# Patient Record
Sex: FEMALE | Race: BLACK OR AFRICAN AMERICAN | NOT HISPANIC OR LATINO | Employment: STUDENT | URBAN - METROPOLITAN AREA
[De-identification: names, ages, dates, MRNs, and addresses within clinical notes are randomized per-mention and may not be internally consistent; named-entity substitution may affect disease eponyms.]

---
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
## 2023-01-07 ENCOUNTER — HOSPITAL ENCOUNTER (EMERGENCY)
Facility: OTHER | Age: 18
Discharge: HOME | End: 2023-01-07
Attending: EMERGENCY MEDICINE
Payer: MEDICAID

## 2023-01-07 VITALS
HEIGHT: 68 IN | SYSTOLIC BLOOD PRESSURE: 126 MMHG | OXYGEN SATURATION: 95 % | HEART RATE: 103 BPM | DIASTOLIC BLOOD PRESSURE: 58 MMHG | RESPIRATION RATE: 20 BRPM | BODY MASS INDEX: 31.83 KG/M2 | TEMPERATURE: 100 F | WEIGHT: 210 LBS

## 2023-01-07 DIAGNOSIS — B34.9 VIRAL SYNDROME: Primary | ICD-10-CM

## 2023-01-07 LAB
B-HCG UR QL: NEGATIVE
CTP QC/QA: YES
POC MOLECULAR INFLUENZA A AGN: NEGATIVE
POC MOLECULAR INFLUENZA B AGN: NEGATIVE
SARS-COV-2 RDRP RESP QL NAA+PROBE: NEGATIVE

## 2023-01-07 PROCEDURE — 25000003 PHARM REV CODE 250: Performed by: EMERGENCY MEDICINE

## 2023-01-07 PROCEDURE — 87635 SARS-COV-2 COVID-19 AMP PRB: CPT | Performed by: EMERGENCY MEDICINE

## 2023-01-07 PROCEDURE — 81025 URINE PREGNANCY TEST: CPT | Performed by: EMERGENCY MEDICINE

## 2023-01-07 PROCEDURE — 99284 EMERGENCY DEPT VISIT MOD MDM: CPT

## 2023-01-07 RX ORDER — ACETAMINOPHEN 500 MG
1000 TABLET ORAL
Status: COMPLETED | OUTPATIENT
Start: 2023-01-07 | End: 2023-01-07

## 2023-01-07 RX ORDER — IBUPROFEN 800 MG/1
800 TABLET ORAL EVERY 6 HOURS PRN
Qty: 30 TABLET | Refills: 0 | Status: SHIPPED | OUTPATIENT
Start: 2023-01-07 | End: 2023-06-13

## 2023-01-07 RX ORDER — BENZONATATE 100 MG/1
100 CAPSULE ORAL 3 TIMES DAILY PRN
Qty: 20 CAPSULE | Refills: 0 | Status: SHIPPED | OUTPATIENT
Start: 2023-01-07 | End: 2023-01-09 | Stop reason: SDUPTHER

## 2023-01-07 RX ADMIN — ACETAMINOPHEN 1000 MG: 500 TABLET, FILM COATED ORAL at 05:01

## 2023-01-07 NOTE — ED PROVIDER NOTES
Encounter Date: 2023    SCRIBE #1 NOTE: I, Hemanth Kendall, am scribing for, and in the presence of,  Saurav Cardenas II, MD.     History     Chief Complaint   Patient presents with    Chills     Reports chills/body aches, productive cough and weakness onset three days ago, worsening today. Discomfort unrelieved by OTC meds. Aaox4.      Time seen by provider: 4:55 PM    This is a 17 y.o. female who presents with complaint of fever, chills, and headache that began last night. The patient also complains of a productive cough for the past 3 days. She reports taking ibuprofen yesterday for her symptoms with minimal relief. She denies any significant PMHx and she states she is vaccinated for Covid. She did not receive the flu vaccine. She denies having any prescription medications or any known allergies. She denies smoking. This is the extent of the patient's complaints at this time.     The history is provided by the patient.   Review of patient's allergies indicates:  No Known Allergies  No past medical history on file.  No past surgical history on file.  Family History   Problem Relation Age of Onset    Eclampsia Other     Miscarriages / Stillbirths Other      labor Other     Cancer Other      Social History     Tobacco Use    Smoking status: Never    Smokeless tobacco: Never   Substance Use Topics    Alcohol use: Never     Comment: socially    Drug use: Yes     Types: Marijuana     Comment: rarely     Review of Systems   Constitutional:  Positive for chills and fever. Negative for activity change, appetite change and diaphoresis.   HENT:  Negative for congestion, sore throat and trouble swallowing.    Eyes:  Negative for photophobia and visual disturbance.   Respiratory:  Positive for cough. Negative for chest tightness and shortness of breath.    Cardiovascular:  Negative for chest pain.   Gastrointestinal:  Negative for abdominal pain, nausea and vomiting.   Endocrine: Negative for polydipsia and  polyuria.   Genitourinary:  Negative for difficulty urinating and flank pain.   Musculoskeletal:  Negative for back pain and neck pain.   Skin:  Negative for rash.   Neurological:  Positive for headaches. Negative for weakness.   Psychiatric/Behavioral:  Negative for confusion.      Physical Exam     Initial Vitals [01/07/23 1624]   BP Pulse Resp Temp SpO2   119/71 (!) 116 (!) 22 (!) 100.5 °F (38.1 °C) 100 %      MAP       --         Physical Exam    Nursing note and vitals reviewed.  Constitutional: She appears well-developed and well-nourished. She is not diaphoretic. No distress.   Warm to touch but not diaphoretic. Not ill appearing.    HENT:   Head: Normocephalic and atraumatic.   Moist mucous membranes.   Eyes: Pupils are equal, round, and reactive to light.   No pallor or icterus.   Neck: Neck supple.   No cervical lymphadenopathy.    Normal range of motion.  Cardiovascular:  Normal rate, regular rhythm and normal heart sounds.     Exam reveals no gallop and no friction rub.       No murmur heard.  Pulmonary/Chest: Breath sounds normal. No respiratory distress. She has no wheezes. She has no rhonchi. She has no rales.   Abdominal: Abdomen is soft. There is no abdominal tenderness.   Musculoskeletal:         General: No tenderness or edema. Normal range of motion.      Cervical back: Normal range of motion and neck supple.     Lymphadenopathy:     She has no cervical adenopathy.   Neurological: She is alert and oriented to person, place, and time.   Skin: Skin is warm and dry.   No acute rash.    Psychiatric: She has a normal mood and affect. Her behavior is normal. Judgment and thought content normal.       ED Course   Procedures  Labs Reviewed   SARS-COV-2 RDRP GENE   POCT INFLUENZA A/B MOLECULAR   POCT URINE PREGNANCY          Imaging Results    None          Medications   acetaminophen tablet 1,000 mg (1,000 mg Oral Given 1/7/23 3167)     Medical Decision Making:   History:   Old Medical Records: I decided  to obtain old medical records.  Clinical Tests:   Lab Tests: Ordered and Reviewed     Patient presents with fevers chills headaches cough for the past few days.  No complicating medical history.  Well-appearing on exam, clear lung fields.  COVID and flu swabs are negative.  Low clinical suspicion for pneumonia.  Suspect nonspecific viral syndrome.  Discussed fever care, fluids, return precautions.  Work note provided.       Scribe Attestation:   Scribe #1: I performed the above scribed service and the documentation accurately describes the services I performed. I attest to the accuracy of the note.          Physician Attestation for Scribe: I, [facility], reviewed documentation as scribed in my presence, which is both accurate and complete.           Clinical Impression:   Final diagnoses:  [B34.9] Viral syndrome (Primary)        ED Disposition Condition    Discharge Stable          ED Prescriptions       Medication Sig Dispense Start Date End Date Auth. Provider    ibuprofen (ADVIL,MOTRIN) 800 MG tablet Take 1 tablet (800 mg total) by mouth every 6 (six) hours as needed for Pain. 30 tablet 1/7/2023 -- Saurav Cardenas II, MD    benzonatate (TESSALON) 100 MG capsule Take 1 capsule (100 mg total) by mouth 3 (three) times daily as needed for Cough. 20 capsule 1/7/2023 1/17/2023 Saurav Cardenas II, MD          Follow-up Information       Follow up With Specialties Details Why Contact Info    Primary Care Clinic  Schedule an appointment as soon as possible for a visit in 5 days               Saurav Cardenas II, MD  01/08/23 0040

## 2023-01-07 NOTE — Clinical Note
"Kaye "Gabbigil" Luis A was seen and treated in our emergency department on 1/7/2023.  She may return to work on 01/11/2023.       If you have any questions or concerns, please don't hesitate to call.      Saurav Cardenas II, MD"

## 2023-01-08 ENCOUNTER — HOSPITAL ENCOUNTER (EMERGENCY)
Facility: OTHER | Age: 18
Discharge: HOME | End: 2023-01-09
Attending: EMERGENCY MEDICINE
Payer: COMMERCIAL

## 2023-01-08 DIAGNOSIS — J18.9 PNEUMONIA OF LEFT LOWER LOBE DUE TO INFECTIOUS ORGANISM: Primary | ICD-10-CM

## 2023-01-08 DIAGNOSIS — R05.9 COUGH IN ADULT: ICD-10-CM

## 2023-01-08 PROCEDURE — 25000003 PHARM REV CODE 250: Performed by: EMERGENCY MEDICINE

## 2023-01-08 PROCEDURE — 99284 EMERGENCY DEPT VISIT MOD MDM: CPT | Mod: 25

## 2023-01-08 PROCEDURE — 96360 HYDRATION IV INFUSION INIT: CPT

## 2023-01-08 RX ADMIN — SODIUM CHLORIDE 1000 ML: 0.9 INJECTION, SOLUTION INTRAVENOUS at 11:01

## 2023-01-09 VITALS
DIASTOLIC BLOOD PRESSURE: 72 MMHG | WEIGHT: 200 LBS | TEMPERATURE: 98 F | OXYGEN SATURATION: 96 % | HEART RATE: 90 BPM | BODY MASS INDEX: 30.41 KG/M2 | SYSTOLIC BLOOD PRESSURE: 128 MMHG | RESPIRATION RATE: 18 BRPM

## 2023-01-09 PROCEDURE — 63700000 PHARM REV CODE 250 ALT 637 W/O HCPCS: Performed by: EMERGENCY MEDICINE

## 2023-01-09 RX ORDER — AZITHROMYCIN 250 MG/1
TABLET, FILM COATED ORAL
Qty: 4 TABLET | Refills: 0 | Status: SHIPPED | OUTPATIENT
Start: 2023-01-09 | End: 2023-01-09

## 2023-01-09 RX ORDER — AZITHROMYCIN 250 MG/1
TABLET, FILM COATED ORAL
Qty: 4 TABLET | Refills: 0 | Status: SHIPPED | OUTPATIENT
Start: 2023-01-09 | End: 2023-01-09 | Stop reason: SDUPTHER

## 2023-01-09 RX ORDER — AZITHROMYCIN 250 MG/1
500 TABLET, FILM COATED ORAL
Status: COMPLETED | OUTPATIENT
Start: 2023-01-09 | End: 2023-01-09

## 2023-01-09 RX ORDER — AZITHROMYCIN 250 MG/1
TABLET, FILM COATED ORAL
Qty: 4 TABLET | Refills: 0 | Status: SHIPPED | OUTPATIENT
Start: 2023-01-09 | End: 2023-01-14

## 2023-01-09 RX ORDER — BENZONATATE 100 MG/1
100 CAPSULE ORAL 3 TIMES DAILY PRN
Qty: 20 CAPSULE | Refills: 0 | Status: SHIPPED | OUTPATIENT
Start: 2023-01-09 | End: 2023-01-19

## 2023-01-09 RX ORDER — BENZONATATE 100 MG/1
100 CAPSULE ORAL 3 TIMES DAILY PRN
Qty: 20 CAPSULE | Refills: 0 | Status: SHIPPED | OUTPATIENT
Start: 2023-01-09 | End: 2023-01-09

## 2023-01-09 RX ADMIN — AZITHROMYCIN MONOHYDRATE 500 MG: 250 TABLET ORAL at 01:01

## 2023-01-09 NOTE — ED PROVIDER NOTES
Encounter Date: 2023    SCRIBE #1 NOTE: I, David Serna, am scribing for, and in the presence of,  Lambert Bustos MD. I have scribed the following portions of the note - Other sections scribed: HPI, ROS, PE CXR.     History     Chief Complaint   Patient presents with    Shortness of Breath     Pt arrives via EMS for SOB with productive cough X 1 hour.      Time seen by provider: 1:28 AM    This is a 17 y.o. female who presents with complaint of shortness of breath starting today. Patient was seen one day ago for fevers, chills, productive cough, and intermittent lightheadedness. These symptoms have since worsened and she now reports palpitations as well as a pre-syncopal episode PTA. She denies any wheezing. SHx of positive sick contact with a nephew who was diagnosed with pneumonia recently. This is the extent of the patient's complaints at this time.    The history is provided by the patient.   Review of patient's allergies indicates:  No Known Allergies  History reviewed. No pertinent past medical history.  History reviewed. No pertinent surgical history.  Family History   Problem Relation Age of Onset    Eclampsia Other     Miscarriages / Stillbirths Other      labor Other     Cancer Other      Social History     Tobacco Use    Smoking status: Never    Smokeless tobacco: Never   Substance Use Topics    Alcohol use: Yes     Comment: socially    Drug use: Yes     Types: Marijuana     Comment: rarely     Review of Systems   Constitutional:  Positive for chills and fever.   HENT:  Negative for congestion and sore throat.    Eyes:  Negative for photophobia and redness.   Respiratory:  Positive for shortness of breath. Negative for cough.    Cardiovascular:  Positive for chest pain. Negative for leg swelling.   Gastrointestinal:  Negative for abdominal pain, nausea and vomiting.   Genitourinary:  Negative for dysuria.   Musculoskeletal:  Negative for back pain.   Skin:  Negative for rash.    Neurological:  Positive for light-headedness. Negative for weakness and headaches.   Psychiatric/Behavioral:  Negative for confusion.      Physical Exam     Initial Vitals   BP Pulse Resp Temp SpO2   01/08/23 2310 01/08/23 2310 01/08/23 2310 01/08/23 2310 01/08/23 2308   125/74 (!) 120 (!) 22 98.2 °F (36.8 °C) (!) 94 %      MAP       --                Physical Exam    ED Course   Procedures  Labs Reviewed   SARS-COV-2 RDRP GENE   POCT INFLUENZA A/B MOLECULAR          Imaging Results              X-Ray Chest PA And Lateral (Final result)  Result time 01/09/23 00:33:37      Final result by Dinora Plummer MD (01/09/23 00:33:37)                   Impression:      Findings concerning for left lower lobe pneumonia.      Electronically signed by: Dinora Plummer  Date:    01/09/2023  Time:    00:33               Narrative:    EXAMINATION:  CHEST PA AND LATERAL    CLINICAL HISTORY:  Cough, unspecified    TECHNIQUE:  PA and lateral chest radiograph    COMPARISON:  <None.>    FINDINGS:  The cardiac silhouette is within normal limits. Scratch there is a left lower lobe infiltrate.  There is no pneumothorax or significant pleural fluid.  Note is made of bilateral nipple piercings.                                    X-Rays:   Independently Interpreted Readings:   Chest X-Ray: Left lower lobe infiltrate. No enlarged cardiac silhouette.   Medications   sodium chloride 0.9% bolus 1,000 mL 1,000 mL (1,000 mLs Intravenous New Bag 1/8/23 4011)     Medical Decision Making:   History:   Old Medical Records: I decided to obtain old medical records.  Independently Interpreted Test(s):   I have ordered and independently interpreted X-rays - see prior notes.  Clinical Tests:   Radiological Study: Ordered and Reviewed        Scribe Attestation:   Scribe #1: I performed the above scribed service and the documentation accurately describes the services I performed. I attest to the accuracy of the note.    Attending Attestation:              Attending ED Notes:   Emergent evaluation of 17-year-old female with recent diagnosis of viral syndrome/upper respiratory infection who now presents to the emergency room with feeling short of breath and worsening cough.  Patient is afebrile, nontoxic-appearing stable vital signs.  Repeat oxygen saturation by MD is 98% on room air.  Patient in no acute respiratory distress.  Chest x-ray reveals left lower lobe consolidation consistent with pneumonia.  The patient is extensively counseled on their diagnosis and treatment including all diagnostic and physical exam findings.  The patient is discharged good condition and directed follow-up with their PCP in the next 24-48 hours.          Physician Attestation for Scribe: I, Lambert Longo, reviewed documentation as scribed in my presence, which is both accurate and complete.         Clinical Impression:   Final diagnoses:  [R05.9] Cough in adult               Lambert Longo MD  01/09/23 0402

## 2023-05-19 ENCOUNTER — OFFICE VISIT (OUTPATIENT)
Dept: OBSTETRICS AND GYNECOLOGY | Facility: CLINIC | Age: 18
End: 2023-05-19
Payer: MEDICAID

## 2023-05-19 VITALS
SYSTOLIC BLOOD PRESSURE: 120 MMHG | BODY MASS INDEX: 34.6 KG/M2 | DIASTOLIC BLOOD PRESSURE: 70 MMHG | HEIGHT: 67 IN | WEIGHT: 220.44 LBS

## 2023-05-19 DIAGNOSIS — B37.31 YEAST VAGINITIS: ICD-10-CM

## 2023-05-19 DIAGNOSIS — Z20.2 POTENTIAL EXPOSURE TO STD: ICD-10-CM

## 2023-05-19 DIAGNOSIS — N89.8 VAGINAL DISCHARGE: Primary | ICD-10-CM

## 2023-05-19 LAB
CANDIDA RRNA VAG QL PROBE: POSITIVE
G VAGINALIS RRNA GENITAL QL PROBE: NEGATIVE
T VAGINALIS RRNA GENITAL QL PROBE: NEGATIVE

## 2023-05-19 PROCEDURE — 99213 OFFICE O/P EST LOW 20 MIN: CPT | Mod: PBBFAC | Performed by: FAMILY MEDICINE

## 2023-05-19 PROCEDURE — 87510 GARDNER VAG DNA DIR PROBE: CPT | Performed by: FAMILY MEDICINE

## 2023-05-19 PROCEDURE — 99999 PR PBB SHADOW E&M-EST. PATIENT-LVL III: ICD-10-PCS | Mod: PBBFAC,,, | Performed by: FAMILY MEDICINE

## 2023-05-19 PROCEDURE — 1160F RVW MEDS BY RX/DR IN RCRD: CPT | Mod: CPTII,,, | Performed by: FAMILY MEDICINE

## 2023-05-19 PROCEDURE — 87591 N.GONORRHOEAE DNA AMP PROB: CPT | Performed by: FAMILY MEDICINE

## 2023-05-19 PROCEDURE — 1159F PR MEDICATION LIST DOCUMENTED IN MEDICAL RECORD: ICD-10-PCS | Mod: CPTII,,, | Performed by: FAMILY MEDICINE

## 2023-05-19 PROCEDURE — 99999 PR PBB SHADOW E&M-EST. PATIENT-LVL III: CPT | Mod: PBBFAC,,, | Performed by: FAMILY MEDICINE

## 2023-05-19 PROCEDURE — 3008F BODY MASS INDEX DOCD: CPT | Mod: CPTII,,, | Performed by: FAMILY MEDICINE

## 2023-05-19 PROCEDURE — 1160F PR REVIEW ALL MEDS BY PRESCRIBER/CLIN PHARMACIST DOCUMENTED: ICD-10-PCS | Mod: CPTII,,, | Performed by: FAMILY MEDICINE

## 2023-05-19 PROCEDURE — 3074F SYST BP LT 130 MM HG: CPT | Mod: CPTII,,, | Performed by: FAMILY MEDICINE

## 2023-05-19 PROCEDURE — 1159F MED LIST DOCD IN RCRD: CPT | Mod: CPTII,,, | Performed by: FAMILY MEDICINE

## 2023-05-19 PROCEDURE — 3074F PR MOST RECENT SYSTOLIC BLOOD PRESSURE < 130 MM HG: ICD-10-PCS | Mod: CPTII,,, | Performed by: FAMILY MEDICINE

## 2023-05-19 PROCEDURE — 3078F DIAST BP <80 MM HG: CPT | Mod: CPTII,,, | Performed by: FAMILY MEDICINE

## 2023-05-19 PROCEDURE — 87480 CANDIDA DNA DIR PROBE: CPT | Performed by: FAMILY MEDICINE

## 2023-05-19 PROCEDURE — 99213 PR OFFICE/OUTPT VISIT, EST, LEVL III, 20-29 MIN: ICD-10-PCS | Mod: S$PBB,,, | Performed by: FAMILY MEDICINE

## 2023-05-19 PROCEDURE — 3008F PR BODY MASS INDEX (BMI) DOCUMENTED: ICD-10-PCS | Mod: CPTII,,, | Performed by: FAMILY MEDICINE

## 2023-05-19 PROCEDURE — 3078F PR MOST RECENT DIASTOLIC BLOOD PRESSURE < 80 MM HG: ICD-10-PCS | Mod: CPTII,,, | Performed by: FAMILY MEDICINE

## 2023-05-19 PROCEDURE — 99213 OFFICE O/P EST LOW 20 MIN: CPT | Mod: S$PBB,,, | Performed by: FAMILY MEDICINE

## 2023-05-19 RX ORDER — FLUCONAZOLE 150 MG/1
150 TABLET ORAL
Qty: 2 TABLET | Refills: 0 | Status: SHIPPED | OUTPATIENT
Start: 2023-05-19 | End: 2023-06-13

## 2023-05-19 NOTE — PROGRESS NOTES
CC: Vaginitis  HPI: Patient presents for evaluation of an abnormal vaginal discharge. Symptoms have been present for a few days. Vaginal symptoms: discharge described as curd-like and yellow and local irritation. Contraception: condoms. She denies odor and urinary symptoms of dysuria, hematuria, and fever . Sexually transmitted infection risk: very low risk of STD exposure. SA male condoms consistently.. This is the extent of the patient's complaints at this time.     ROS:  GENERAL: No fever, chills, fatigability or weight loss.  VULVAR: No pain, no lesions and no itching.  VAGINAL: No relaxation, + itching/irritation, + discharge, no abnormal bleeding and no lesions.  URINARY: No incontinence, no nocturia, no frequency and no dysuria.     PHYSICAL EXAM:  Physical Exam:   Constitutional: She appears well-developed and well-nourished. She does not appear ill. No distress.               Genitourinary:    Uterus, right adnexa and left adnexa normal.      Pelvic exam was performed with patient supine.   The external female genitalia was normal.   Labial bartholins normal.There is no rash, tenderness or lesion on the right labia. There is no rash, tenderness or lesion on the left labia. Cervix is normal. Right adnexum displays no mass, no tenderness and no fullness. Left adnexum displays no mass, no tenderness and no fullness. There is vaginal discharge (thick white-yellow adherent to vaginal walls) in the vagina. No tenderness, bleeding, rectocele, cystocele or unspecified prolapse of vaginal walls in the vagina.    No foreign body in the vagina.   Cervix exhibits no motion tenderness, no lesion, no discharge, no friability, no lesion and no polyp. Normal urethral meatus.Urethra findings: no urethral mass              Neurological: She is alert. GCS eye subscore is 4. GCS verbal subscore is 5. GCS motor subscore is 6.         History reviewed. No pertinent past medical history.    History reviewed. No pertinent  surgical history.    Family History   Problem Relation Age of Onset    Eclampsia Other     Miscarriages / Stillbirths Other      labor Other     Cancer Other        Social History     Socioeconomic History    Marital status: Single   Tobacco Use    Smoking status: Never    Smokeless tobacco: Never   Substance and Sexual Activity    Alcohol use: Yes     Comment: socially    Drug use: Yes     Types: Marijuana     Comment: rarely    Sexual activity: Yes     Partners: Male     Birth control/protection: None       Current Outpatient Medications   Medication Sig Dispense Refill    ibuprofen (ADVIL,MOTRIN) 800 MG tablet Take 1 tablet (800 mg total) by mouth every 6 (six) hours as needed for Pain. 30 tablet 0    fluconazole (DIFLUCAN) 150 MG Tab Take 1 tablet (150 mg total) by mouth every 72 hours as needed (vaginal itching/discharge). 2 tablet 0    hydrocortisone valerate (WEST-ALVIN) 0.2 % ointment Apply topically 2 (two) times daily. for 10 days 15 g 0     No current facility-administered medications for this visit.       Review of patient's allergies indicates:  No Known Allergies       OB History    Para Term  AB Living   0 0 0 0 0 0   SAB IAB Ectopic Multiple Live Births   0 0 0 0 0          Assessment/Plan:    Vaginal discharge  -     Vaginosis Screen by DNA Probe    Potential exposure to STD  -     C. trachomatis/N. gon[facility]rhoeae [facility]; Cervicovaginal    Yeast vaginitis  -     fluconazole (DIFLUCAN) 150 MG Tab; Take 1 tablet (150 mg total) by mouth every 72 hours as needed (vaginal itching/discharge).  Dispense: 2 tablet; Refill: 0          Patient was counseled today on vaginitis prevention including :  a. avoiding feminine products such as deoderant soaps, body wash, bubble bath, douches, scented toilet paper, deoderant tampons or pads, feminine wipes, chronic pad use, etc.  b. avoiding other vulvovaginal irritants such as long hot baths, humidity, tight, synthetic clothing, chlorine  and sitting around in wet bathing suits  c. wearing cotton underwear, avoiding thong underwear and no underwear to bed  d. taking showers instead of baths and use a hair dryer on cool setting afterwards to dry  e. wearing cotton to exercise and shower immediately after exercise and change clothes  f. using polyurethane condoms without spermicide if sexually active and symptoms are triggered by intercourse     FOLLOW UP: PRN/lack of improvement.

## 2023-05-22 ENCOUNTER — PATIENT MESSAGE (OUTPATIENT)
Dept: OBSTETRICS AND GYNECOLOGY | Facility: CLINIC | Age: 18
End: 2023-05-22
Payer: MEDICAID

## 2023-05-23 LAB
C TRACH DNA SPEC QL NAA+PROBE: NOT DETECTED
N GONORRHOEA DNA SPEC QL NAA+PROBE: NOT DETECTED

## 2023-06-13 ENCOUNTER — OFFICE VISIT (OUTPATIENT)
Dept: OBSTETRICS AND GYNECOLOGY | Facility: CLINIC | Age: 18
End: 2023-06-13
Payer: MEDICAID

## 2023-06-13 VITALS
HEIGHT: 67 IN | WEIGHT: 218.25 LBS | DIASTOLIC BLOOD PRESSURE: 84 MMHG | BODY MASS INDEX: 34.26 KG/M2 | SYSTOLIC BLOOD PRESSURE: 126 MMHG

## 2023-06-13 DIAGNOSIS — L75.0 URINARY BODY ODOR: ICD-10-CM

## 2023-06-13 DIAGNOSIS — Z32.02 NEGATIVE PREGNANCY TEST: ICD-10-CM

## 2023-06-13 DIAGNOSIS — R30.0 DYSURIA: Primary | ICD-10-CM

## 2023-06-13 DIAGNOSIS — Z20.2 POTENTIAL EXPOSURE TO STD: ICD-10-CM

## 2023-06-13 LAB
B-HCG UR QL: NEGATIVE
BILIRUB UR QL STRIP: NEGATIVE
CTP QC/QA: YES
GLUCOSE UR QL STRIP: NEGATIVE
KETONES UR QL STRIP: NEGATIVE
LEUKOCYTE ESTERASE UR QL STRIP: NEGATIVE
PH, POC UA: 7
POC BLOOD, URINE: NEGATIVE
POC NITRATES, URINE: NEGATIVE
PROT UR QL STRIP: NEGATIVE
SP GR UR STRIP: 1.01 (ref 1–1.03)
UROBILINOGEN UR STRIP-ACNC: NORMAL (ref 0.1–1.1)

## 2023-06-13 PROCEDURE — 99213 PR OFFICE/OUTPT VISIT, EST, LEVL III, 20-29 MIN: ICD-10-PCS | Mod: S$PBB,,, | Performed by: FAMILY MEDICINE

## 2023-06-13 PROCEDURE — 87591 N.GONORRHOEAE DNA AMP PROB: CPT | Performed by: FAMILY MEDICINE

## 2023-06-13 PROCEDURE — 3079F PR MOST RECENT DIASTOLIC BLOOD PRESSURE 80-89 MM HG: ICD-10-PCS | Mod: CPTII,,, | Performed by: FAMILY MEDICINE

## 2023-06-13 PROCEDURE — 1159F MED LIST DOCD IN RCRD: CPT | Mod: CPTII,,, | Performed by: FAMILY MEDICINE

## 2023-06-13 PROCEDURE — 1160F PR REVIEW ALL MEDS BY PRESCRIBER/CLIN PHARMACIST DOCUMENTED: ICD-10-PCS | Mod: CPTII,,, | Performed by: FAMILY MEDICINE

## 2023-06-13 PROCEDURE — 3079F DIAST BP 80-89 MM HG: CPT | Mod: CPTII,,, | Performed by: FAMILY MEDICINE

## 2023-06-13 PROCEDURE — 81003 URINALYSIS AUTO W/O SCOPE: CPT | Mod: PBBFAC | Performed by: FAMILY MEDICINE

## 2023-06-13 PROCEDURE — 81025 URINE PREGNANCY TEST: CPT | Mod: PBBFAC | Performed by: FAMILY MEDICINE

## 2023-06-13 PROCEDURE — 87086 URINE CULTURE/COLONY COUNT: CPT | Performed by: FAMILY MEDICINE

## 2023-06-13 PROCEDURE — 99999 PR PBB SHADOW E&M-EST. PATIENT-LVL III: CPT | Mod: PBBFAC,,, | Performed by: FAMILY MEDICINE

## 2023-06-13 PROCEDURE — 1159F PR MEDICATION LIST DOCUMENTED IN MEDICAL RECORD: ICD-10-PCS | Mod: CPTII,,, | Performed by: FAMILY MEDICINE

## 2023-06-13 PROCEDURE — 1160F RVW MEDS BY RX/DR IN RCRD: CPT | Mod: CPTII,,, | Performed by: FAMILY MEDICINE

## 2023-06-13 PROCEDURE — 99999 PR PBB SHADOW E&M-EST. PATIENT-LVL III: ICD-10-PCS | Mod: PBBFAC,,, | Performed by: FAMILY MEDICINE

## 2023-06-13 PROCEDURE — 3008F BODY MASS INDEX DOCD: CPT | Mod: CPTII,,, | Performed by: FAMILY MEDICINE

## 2023-06-13 PROCEDURE — 81514 NFCT DS BV&VAGINITIS DNA ALG: CPT | Performed by: FAMILY MEDICINE

## 2023-06-13 PROCEDURE — 99213 OFFICE O/P EST LOW 20 MIN: CPT | Mod: PBBFAC | Performed by: FAMILY MEDICINE

## 2023-06-13 PROCEDURE — 3074F SYST BP LT 130 MM HG: CPT | Mod: CPTII,,, | Performed by: FAMILY MEDICINE

## 2023-06-13 PROCEDURE — 99213 OFFICE O/P EST LOW 20 MIN: CPT | Mod: S$PBB,,, | Performed by: FAMILY MEDICINE

## 2023-06-13 PROCEDURE — 3074F PR MOST RECENT SYSTOLIC BLOOD PRESSURE < 130 MM HG: ICD-10-PCS | Mod: CPTII,,, | Performed by: FAMILY MEDICINE

## 2023-06-13 PROCEDURE — 3008F PR BODY MASS INDEX (BMI) DOCUMENTED: ICD-10-PCS | Mod: CPTII,,, | Performed by: FAMILY MEDICINE

## 2023-06-13 NOTE — PROGRESS NOTES
"  Chief Complaint: Dysuria     HPI  Pt reports having burning with urination and odor for the last 14  Days. The burning is more right after urination with bladder discomfort. Nothing makes it better/worse. She reports  no urinary frequency, urgency, hematuria or flank pain at this time. She denies fever, chills, n/v/d. No abnormal discharge, itching. She has had a new partner that she used a condom with. Would like std testing. Cycle 17 days late, took home upt and neg. States last year she went 3 months without cycle then it returned to monthly. This is the extent of the patient's complaints at this time.     /84   Ht 5' 7" (1.702 m)   Wt 99 kg (218 lb 4.1 oz)   LMP 2023 (Exact Date)   BMI 34.18 kg/m²     History reviewed. No pertinent past medical history.    History reviewed. No pertinent surgical history.    Family History   Problem Relation Age of Onset    Eclampsia Other     Miscarriages / Stillbirths Other      labor Other     Cancer Other     Breast cancer Neg Hx     Colon cancer Neg Hx     Ovarian cancer Neg Hx        Social History     Socioeconomic History    Marital status: Single   Tobacco Use    Smoking status: Never    Smokeless tobacco: Never   Substance and Sexual Activity    Alcohol use: Yes     Comment: socially    Drug use: Yes     Types: Marijuana     Comment: rarely    Sexual activity: Yes     Partners: Male     Birth control/protection: None       Current Outpatient Medications   Medication Sig Dispense Refill    hydrocortisone valerate (WEST-ALVIN) 0.2 % ointment Apply topically 2 (two) times daily. for 10 days 15 g 0     No current facility-administered medications for this visit.       Review of patient's allergies indicates:  No Known Allergies       OB History    Para Term  AB Living   0 0 0 0 0 0   SAB IAB Ectopic Multiple Live Births   0 0 0 0 0          ROS   Systemic: No fever.  Gastrointestinal: No nausea and no suprapubic pain.  No diarrhea and " no constipation.  Genitourinary: No urinary loss of control.  Positive for Dysuria.  No flank pain. +odor. No vaginal discharge/itching  Skin: No rash.    Physical Exam   Physical Exam:   Constitutional: She appears well-developed and well-nourished. She does not appear ill. No distress.             Abdominal: Soft. Bowel sounds are normal. There is no abdominal tenderness. There is no rebound, no guarding, no right CVA tenderness and no left CVA tenderness.     Genitourinary:    Uterus, right adnexa and left adnexa normal.      Pelvic exam was performed with patient supine.   The external female genitalia was normal.   Labial bartholins normal.There is no rash, tenderness or lesion on the right labia. There is no rash, tenderness or lesion on the left labia. Cervix is normal. Right adnexum displays no mass, no tenderness and no fullness. Left adnexum displays no mass, no tenderness and no fullness. There is vaginal discharge (scant thin white no odor) in the vagina. No tenderness, bleeding, rectocele, cystocele or unspecified prolapse of vaginal walls in the vagina.    No foreign body in the vagina.   Cervix exhibits no motion tenderness, no lesion, no discharge, no friability, no lesion and no polyp. Normal urethral meatus.Urethra findings: no urethral mass              Neurological: She is alert. GCS eye subscore is 4. GCS verbal subscore is 5. GCS motor subscore is 6.       Results for orders placed or performed in visit on 06/13/23   POCT urine pregnancy   Result Value Ref Range    POC Preg Test, Ur Negative Negative     Acceptable Yes    POCT Urinalysis, Dipstick, Automated, W/O Scope   Result Value Ref Range    POC Blood, Urine Negative Negative    POC Bilirubin, Urine Negative Negative    POC Urobilinogen, Urine normal 0.1 - 1.1    POC Ketones, Urine Negative Negative    POC Protein, Urine Negative Negative    POC Nitrates, Urine Negative Negative    POC Glucose, Urine Negative Negative    pH,  UA 7     POC Specific Gravity, Urine 1.010 1.003 - 1.029    POC Leukocytes, Urine Negative Negative       Assessment/Plan:    Dysuria  -     POCT Urinalysis, Dipstick, Automated, W/O Scope  -     Urine culture    Negative pregnancy test  -     POCT urine pregnancy    Potential exposure to STD  -     C. trachomatis/N. gonorrhoeae by AMP DNA [facility]; Cervicovaginal    Urinary body odor  -     Vaginosis Screen by DNA Probe      -advised f/u if cycle continues to be irregular pt agreeable

## 2023-06-14 LAB
BACTERIA UR CULT: NORMAL
BACTERIA UR CULT: NORMAL
BACTERIAL VAGINOSIS DNA: NEGATIVE
C TRACH DNA SPEC QL NAA+PROBE: NOT DETECTED
CANDIDA GLABRATA DNA: NEGATIVE
CANDIDA KRUSEI DNA: NEGATIVE
CANDIDA RRNA VAG QL PROBE: NEGATIVE
N GONORRHOEA DNA SPEC QL NAA+PROBE: NOT DETECTED
T VAGINALIS RRNA GENITAL QL PROBE: NEGATIVE

## 2023-06-15 ENCOUNTER — PATIENT MESSAGE (OUTPATIENT)
Dept: OBSTETRICS AND GYNECOLOGY | Facility: CLINIC | Age: 18
End: 2023-06-15
Payer: MEDICAID

## 2023-06-15 DIAGNOSIS — R30.0 DYSURIA: Primary | ICD-10-CM

## 2023-06-15 RX ORDER — PHENAZOPYRIDINE HYDROCHLORIDE 200 MG/1
200 TABLET, FILM COATED ORAL 3 TIMES DAILY PRN
Qty: 6 TABLET | Refills: 0 | Status: SHIPPED | OUTPATIENT
Start: 2023-06-15 | End: 2023-06-17

## 2023-06-20 ENCOUNTER — LAB VISIT (OUTPATIENT)
Dept: LAB | Facility: OTHER | Age: 18
End: 2023-06-20
Attending: FAMILY MEDICINE
Payer: MEDICAID

## 2023-06-20 ENCOUNTER — TELEPHONE (OUTPATIENT)
Dept: OBSTETRICS AND GYNECOLOGY | Facility: CLINIC | Age: 18
End: 2023-06-20
Payer: MEDICAID

## 2023-06-20 DIAGNOSIS — R30.0 DYSURIA: Primary | ICD-10-CM

## 2023-06-20 DIAGNOSIS — R30.0 DYSURIA: ICD-10-CM

## 2023-06-20 PROCEDURE — 87077 CULTURE AEROBIC IDENTIFY: CPT | Performed by: FAMILY MEDICINE

## 2023-06-20 PROCEDURE — 87086 URINE CULTURE/COLONY COUNT: CPT | Performed by: FAMILY MEDICINE

## 2023-06-20 PROCEDURE — 87186 SC STD MICRODIL/AGAR DIL: CPT | Performed by: FAMILY MEDICINE

## 2023-06-20 PROCEDURE — 87088 URINE BACTERIA CULTURE: CPT | Performed by: FAMILY MEDICINE

## 2023-06-20 NOTE — TELEPHONE ENCOUNTER
Called pt back, advised pt to drop off urine specimen to lab today. Pt agreed and we found good time for pt's ,lab appt. Pt confirmed to cancel tomorrow's appointment [facility]. Pt verbalized understanding.       Returned pt's call, pt stated she is having odor and burning during urinating. Advised pt I will consult with JEANNE Liu and give the pt a call back.       ----- Message from Ruperto Argueta sent at 6/20/2023 11:04 AM CDT -----  Contact: Patient  Type:  Patient Call          Who Called: patient         Does the patient know what this is regarding?: requesting a call back to have a appt and orders for a possible UT pt said that she's in pain ; please advise           Would the patient rather a call back or a response via [facility]? call          Best Call Back Number:[phone] (home)              Additional Information:

## 2023-06-22 LAB — BACTERIA UR CULT: ABNORMAL

## 2023-06-23 DIAGNOSIS — N30.00 ACUTE CYSTITIS WITHOUT HEMATURIA: Primary | ICD-10-CM

## 2023-06-23 RX ORDER — NITROFURANTOIN 25; 75 MG/1; MG/1
100 CAPSULE ORAL 2 TIMES DAILY
Qty: 10 CAPSULE | Refills: 0 | Status: SHIPPED | OUTPATIENT
Start: 2023-06-23 | End: 2023-06-28

## 2023-08-10 ENCOUNTER — TELEPHONE (OUTPATIENT)
Dept: OBSTETRICS AND GYNECOLOGY | Facility: CLINIC | Age: 18
End: 2023-08-10
Payer: MEDICAID

## 2023-08-10 NOTE — TELEPHONE ENCOUNTER
----- Message from Ruperto Argueta sent at 8/9/2023  9:03 AM CDT -----  Contact: Patient  Type:  Patient Call          Who Called: Patient         Does the patient know what this is regarding?: requesting a call back for a same day appt ; pt said that she's in pain and have a strong urine smell when she use the restroom also discomfort  at all times; please advisee           Would the patient rather a call back or a response via [facility]? Call           Best Call Back Number:[phone] (home)              Additional Information:

## 2023-08-15 ENCOUNTER — LAB VISIT (OUTPATIENT)
Dept: LAB | Facility: OTHER | Age: 18
End: 2023-08-15
Attending: FAMILY MEDICINE
Payer: MEDICAID

## 2023-08-15 ENCOUNTER — OFFICE VISIT (OUTPATIENT)
Dept: OBSTETRICS AND GYNECOLOGY | Facility: CLINIC | Age: 18
End: 2023-08-15
Payer: MEDICAID

## 2023-08-15 VITALS
WEIGHT: 216.06 LBS | DIASTOLIC BLOOD PRESSURE: 60 MMHG | HEIGHT: 67 IN | SYSTOLIC BLOOD PRESSURE: 110 MMHG | BODY MASS INDEX: 33.91 KG/M2

## 2023-08-15 DIAGNOSIS — R30.0 DYSURIA: ICD-10-CM

## 2023-08-15 DIAGNOSIS — Z20.2 POTENTIAL EXPOSURE TO STD: ICD-10-CM

## 2023-08-15 DIAGNOSIS — Z30.9 ENCOUNTER FOR CONTRACEPTIVE MANAGEMENT, UNSPECIFIED TYPE: ICD-10-CM

## 2023-08-15 DIAGNOSIS — N89.8 VAGINAL DISCHARGE: Primary | ICD-10-CM

## 2023-08-15 LAB
B-HCG UR QL: NEGATIVE
BILIRUB SERPL-MCNC: NEGATIVE MG/DL
BLOOD URINE, POC: ABNORMAL
CLARITY, POC UA: ABNORMAL
COLOR, POC UA: ABNORMAL
CTP QC/QA: YES
GLUCOSE UR QL STRIP: NEGATIVE
HBV SURFACE AG SERPL QL IA: NORMAL
HCV AB SERPL QL IA: NORMAL
HIV 1+2 AB+HIV1 P24 AG SERPL QL IA: NORMAL
KETONES UR QL STRIP: NEGATIVE
LEUKOCYTE ESTERASE URINE, POC: ABNORMAL
NITRITE, POC UA: NEGATIVE
PH, POC UA: 6
PROTEIN, POC: ABNORMAL
RPR SER QL: NORMAL
SPECIFIC GRAVITY, POC UA: 1
UROBILINOGEN, POC UA: NEGATIVE

## 2023-08-15 PROCEDURE — 36415 COLL VENOUS BLD VENIPUNCTURE: CPT | Performed by: FAMILY MEDICINE

## 2023-08-15 PROCEDURE — 99213 OFFICE O/P EST LOW 20 MIN: CPT | Mod: S$PBB,,, | Performed by: FAMILY MEDICINE

## 2023-08-15 PROCEDURE — 99999PBSHW POCT URINE DIPSTICK WITHOUT MICROSCOPE: Mod: PBBFAC,,,

## 2023-08-15 PROCEDURE — 3074F PR MOST RECENT SYSTOLIC BLOOD PRESSURE < 130 MM HG: ICD-10-PCS | Mod: CPTII,,, | Performed by: FAMILY MEDICINE

## 2023-08-15 PROCEDURE — 86592 SYPHILIS TEST NON-TREP QUAL: CPT | Performed by: FAMILY MEDICINE

## 2023-08-15 PROCEDURE — 3074F SYST BP LT 130 MM HG: CPT | Mod: CPTII,,, | Performed by: FAMILY MEDICINE

## 2023-08-15 PROCEDURE — 86803 HEPATITIS C AB TEST: CPT | Performed by: FAMILY MEDICINE

## 2023-08-15 PROCEDURE — 81002 URINALYSIS NONAUTO W/O SCOPE: CPT | Mod: PBBFAC | Performed by: FAMILY MEDICINE

## 2023-08-15 PROCEDURE — 99213 PR OFFICE/OUTPT VISIT, EST, LEVL III, 20-29 MIN: ICD-10-PCS | Mod: S$PBB,,, | Performed by: FAMILY MEDICINE

## 2023-08-15 PROCEDURE — 3008F BODY MASS INDEX DOCD: CPT | Mod: CPTII,,, | Performed by: FAMILY MEDICINE

## 2023-08-15 PROCEDURE — 99999PBSHW POCT URINE DIPSTICK WITHOUT MICROSCOPE: ICD-10-PCS | Mod: PBBFAC,,,

## 2023-08-15 PROCEDURE — 87340 HEPATITIS B SURFACE AG IA: CPT | Performed by: FAMILY MEDICINE

## 2023-08-15 PROCEDURE — 1160F PR REVIEW ALL MEDS BY PRESCRIBER/CLIN PHARMACIST DOCUMENTED: ICD-10-PCS | Mod: CPTII,,, | Performed by: FAMILY MEDICINE

## 2023-08-15 PROCEDURE — 99999PBSHW POCT URINE PREGNANCY: Mod: PBBFAC,,,

## 2023-08-15 PROCEDURE — 3008F PR BODY MASS INDEX (BMI) DOCUMENTED: ICD-10-PCS | Mod: CPTII,,, | Performed by: FAMILY MEDICINE

## 2023-08-15 PROCEDURE — 81514 NFCT DS BV&VAGINITIS DNA ALG: CPT | Performed by: FAMILY MEDICINE

## 2023-08-15 PROCEDURE — 3078F PR MOST RECENT DIASTOLIC BLOOD PRESSURE < 80 MM HG: ICD-10-PCS | Mod: CPTII,,, | Performed by: FAMILY MEDICINE

## 2023-08-15 PROCEDURE — 1159F PR MEDICATION LIST DOCUMENTED IN MEDICAL RECORD: ICD-10-PCS | Mod: CPTII,,, | Performed by: FAMILY MEDICINE

## 2023-08-15 PROCEDURE — 99999 PR PBB SHADOW E&M-EST. PATIENT-LVL III: ICD-10-PCS | Mod: PBBFAC,,, | Performed by: FAMILY MEDICINE

## 2023-08-15 PROCEDURE — 1160F RVW MEDS BY RX/DR IN RCRD: CPT | Mod: CPTII,,, | Performed by: FAMILY MEDICINE

## 2023-08-15 PROCEDURE — 87591 N.GONORRHOEAE DNA AMP PROB: CPT | Performed by: FAMILY MEDICINE

## 2023-08-15 PROCEDURE — 99213 OFFICE O/P EST LOW 20 MIN: CPT | Mod: PBBFAC | Performed by: FAMILY MEDICINE

## 2023-08-15 PROCEDURE — 87086 URINE CULTURE/COLONY COUNT: CPT | Performed by: FAMILY MEDICINE

## 2023-08-15 PROCEDURE — 81025 URINE PREGNANCY TEST: CPT | Mod: PBBFAC | Performed by: FAMILY MEDICINE

## 2023-08-15 PROCEDURE — 87389 HIV-1 AG W/HIV-1&-2 AB AG IA: CPT | Performed by: FAMILY MEDICINE

## 2023-08-15 PROCEDURE — 1159F MED LIST DOCD IN RCRD: CPT | Mod: CPTII,,, | Performed by: FAMILY MEDICINE

## 2023-08-15 PROCEDURE — 99999 PR PBB SHADOW E&M-EST. PATIENT-LVL III: CPT | Mod: PBBFAC,,, | Performed by: FAMILY MEDICINE

## 2023-08-15 PROCEDURE — 3078F DIAST BP <80 MM HG: CPT | Mod: CPTII,,, | Performed by: FAMILY MEDICINE

## 2023-08-15 NOTE — PROGRESS NOTES
CC: Vaginitis  HPI: Patient presents for evaluation of an abnormal vaginal discharge. Symptoms have been present for 1 week. Vaginal symptoms: discharge described as brown, local irritation, and urinary symptoms of dysuria and urinary frequency. Contraception: none. She denies odor and urinary symptoms of hematuria and fever or flank pain . Sexually transmitted infection risk: possible STD exposure. SA male partner, does not use condoms.  2 weeks ago took plan b then had irregular bleeding. Cycle normally monthly. This is the extent of the patient's complaints at this time.     ROS:  GENERAL: No fever, chills, fatigability or weight loss.  VULVAR: No pain, no lesions and no itching.  VAGINAL: No relaxation, + itching, + discharge, + abnormal bleeding and no lesions.  URINARY: No incontinence, no nocturia, + frequency and + dysuria.     PHYSICAL EXAM:  Physical Exam:   Constitutional: She appears well-developed and well-nourished. She does not appear ill. No distress.               Genitourinary:    Uterus, right adnexa and left adnexa normal.      Pelvic exam was performed with patient supine.   The external female genitalia was normal.   Labial bartholins normal.There is no rash, tenderness or lesion on the right labia. There is no rash, tenderness or lesion on the left labia. Cervix is normal. Right adnexum displays no mass, no tenderness and no fullness. Left adnexum displays no mass, no tenderness and no fullness. There is vaginal discharge (milky with some spotting) in the vagina. No tenderness, bleeding, rectocele, cystocele or unspecified prolapse of vaginal walls in the vagina.    No foreign body in the vagina.   Cervix exhibits no motion tenderness, no lesion, no discharge, no friability, no lesion and no polyp. Normal urethral meatus.Urethra findings: no urethral mass              Neurological: She is alert. GCS eye subscore is 4. GCS verbal subscore is 5. GCS motor subscore is 6.           History  reviewed. No pertinent past medical history.    History reviewed. No pertinent surgical history.    Family History   Problem Relation Age of Onset    Eclampsia Other     Miscarriages / Stillbirths Other      labor Other     Cancer Other     Breast cancer Neg Hx     Colon cancer Neg Hx     Ovarian cancer Neg Hx        Social History     Socioeconomic History    Marital status: Single   Tobacco Use    Smoking status: Never    Smokeless tobacco: Never   Substance and Sexual Activity    Alcohol use: Yes     Comment: socially    Drug use: Yes     Types: Marijuana     Comment: rarely    Sexual activity: Yes     Partners: Male     Birth control/protection: None       Current Outpatient Medications   Medication Sig Dispense Refill    hydrocortisone valerate (WEST-ALVIN) 0.2 % ointment Apply topically 2 (two) times daily. for 10 days 15 g 0     No current facility-administered medications for this visit.       Review of patient's allergies indicates:  No Known Allergies       OB History    Para Term  AB Living   0 0 0 0 0 0   SAB IAB Ectopic Multiple Live Births   0 0 0 0 0      Results for orders placed or performed in visit on 08/15/23   POCT urine pregnancy   Result Value Ref Range    POC Preg Test, Ur Negative Negative     Acceptable Yes    POCT urine dipstick without microscope   Result Value Ref Range    Color, UA Straw     pH, UA 6     WBC, UA trace     Nitrite, UA negative     Protein, POC trace     Glucose, UA negative     Ketones, UA negative     Urobilinogen, UA negative     Bilirubin, POC negative     Blood, +     Clarity, UA Slightly Cloudy     Spec Grav UA 1.005          Assessment/Plan:    Vaginal discharge  -     Vaginosis Screen by DNA Probe    Potential exposure to STD  -     C. trachomatis/N. gonorrhoeae by AMP DNA [facility]; Cervicovaginal  -     HIV 1/2 Ag/Ab (4th Gen); Future; Expected date: 08/15/2023  -     RPR; Future; Expected date: 08/15/2023  -     Hepatitis  C Antibody; Future; Expected date: 08/15/2023  -     Hepatitis B Surface Antigen; Future; Expected date: 08/15/2023    Encounter for contraceptive management, unspecified type  -     Device Authorization Order    Dysuria  -     POCT urine pregnancy  -     POCT urine dipstick without microscope  -     CULTURE, URINE    Patient desires to begin contraception, risks, benefits and options discussed in detail. She chooses nexplanon  The use of the nexplanon has been fully discussed with the patient. This includes the proper method to initiate (i.e. on next normal menstrual) and warnings about anticipated minor side effects such as breakthrough spotting, nausea, breast tenderness, weight changes, acne, headaches, cycle changes etc. She has been told of the more serious potential side effects such as infection/migration all of which are very unlikely. She has been asked to report any signs of such serious problems immediately.  The need for additional protection, such as a condom, to prevent exposure to sexually transmitted diseases has also been discussed- She verbalizes understanding and wishes to continue       Patient was counseled today on vaginitis prevention including :  a. avoiding feminine products such as deoderant soaps, body wash, bubble bath, douches, scented toilet paper, deoderant tampons or pads, feminine wipes, chronic pad use, etc.  b. avoiding other vulvovaginal irritants such as long hot baths, humidity, tight, synthetic clothing, chlorine and sitting around in wet bathing suits  c. wearing cotton underwear, avoiding thong underwear and no underwear to bed  d. taking showers instead of baths and use a hair dryer on cool setting afterwards to dry  e. wearing cotton to exercise and shower immediately after exercise and change clothes  f. using polyurethane condoms without spermicide if sexually active and symptoms are triggered by intercourse     FOLLOW UP: PRN/lack of improvement.

## 2023-08-16 ENCOUNTER — PATIENT MESSAGE (OUTPATIENT)
Dept: OBSTETRICS AND GYNECOLOGY | Facility: CLINIC | Age: 18
End: 2023-08-16
Payer: MEDICAID

## 2023-08-16 DIAGNOSIS — B37.31 YEAST VAGINITIS: Primary | ICD-10-CM

## 2023-08-16 LAB
BACTERIA UR CULT: NORMAL
BACTERIA UR CULT: NORMAL
BACTERIAL VAGINOSIS DNA: NEGATIVE
C TRACH DNA SPEC QL NAA+PROBE: NOT DETECTED
CANDIDA GLABRATA DNA: NEGATIVE
CANDIDA KRUSEI DNA: NEGATIVE
CANDIDA RRNA VAG QL PROBE: POSITIVE
N GONORRHOEA DNA SPEC QL NAA+PROBE: NOT DETECTED
T VAGINALIS RRNA GENITAL QL PROBE: NEGATIVE

## 2023-08-16 RX ORDER — FLUCONAZOLE 150 MG/1
150 TABLET ORAL
Qty: 2 TABLET | Refills: 0 | Status: SHIPPED | OUTPATIENT
Start: 2023-08-16

## 2023-08-22 ENCOUNTER — PROCEDURE VISIT (OUTPATIENT)
Dept: OBSTETRICS AND GYNECOLOGY | Facility: CLINIC | Age: 18
End: 2023-08-22
Payer: MEDICAID

## 2023-08-22 VITALS
WEIGHT: 214.5 LBS | SYSTOLIC BLOOD PRESSURE: 114 MMHG | DIASTOLIC BLOOD PRESSURE: 80 MMHG | BODY MASS INDEX: 33.67 KG/M2 | HEIGHT: 67 IN

## 2023-08-22 DIAGNOSIS — Z30.017 NEXPLANON INSERTION: Primary | ICD-10-CM

## 2023-08-22 DIAGNOSIS — Z32.02 NEGATIVE PREGNANCY TEST: ICD-10-CM

## 2023-08-22 LAB
B-HCG UR QL: NEGATIVE
CTP QC/QA: YES

## 2023-08-22 PROCEDURE — 99999PBSHW POCT URINE PREGNANCY: Mod: PBBFAC,,,

## 2023-08-22 PROCEDURE — 11981 INSERTION DRUG DLVR IMPLANT: CPT | Mod: S$PBB,,, | Performed by: FAMILY MEDICINE

## 2023-08-22 PROCEDURE — 99499 NO LOS: ICD-10-PCS | Mod: S$PBB,,, | Performed by: FAMILY MEDICINE

## 2023-08-22 PROCEDURE — 81025 URINE PREGNANCY TEST: CPT | Mod: PBBFAC | Performed by: FAMILY MEDICINE

## 2023-08-22 PROCEDURE — 99999PBSHW POCT URINE PREGNANCY: ICD-10-PCS | Mod: PBBFAC,,,

## 2023-08-22 PROCEDURE — 99499 UNLISTED E&M SERVICE: CPT | Mod: S$PBB,,, | Performed by: FAMILY MEDICINE

## 2023-08-22 PROCEDURE — 11981 INSERTION OF NEXPLANON: ICD-10-PCS | Mod: S$PBB,,, | Performed by: FAMILY MEDICINE

## 2023-08-22 PROCEDURE — 99999PBSHW PR PBB SHADOW TECHNICAL ONLY FILED TO HB: Mod: PBBFAC,,,

## 2023-08-22 PROCEDURE — 11981 INSERTION DRUG DLVR IMPLANT: CPT | Mod: PBBFAC | Performed by: FAMILY MEDICINE

## 2023-08-22 RX ADMIN — ETONOGESTREL 68 MG: 68 IMPLANT SUBCUTANEOUS at 09:08

## 2023-08-22 NOTE — PROCEDURES
Insertion of Nexplanon    Date/Time: 8/22/2023 9:00 AM    Performed by: Noe Mejia NP  Authorized by: Noe Mejia NP    Consent obtained:  Written  Consent given by:  Patient  Patient questions answered: yes    Patient agrees, verbalizes understanding, and wants to proceed: yes    Educational handouts given: yes    Instructions and paperwork completed: yes    Pre-procedure timeout performed: yes    Local anesthetic:  Lidocaine without epinephrine  The site was cleaned and prepped in a sterile fashion: yes    Small stab incision was made in arm: yes    Left/right:  Left   68 mg etonogestreL 68 mg  Preloaded Implanon trocar was placed subdermally: yes    Visualization of implant was obtained: yes    Nexplanon was inserted and trocar removed: yes    Visualization of notch in stilette and palpitation of device: yes    Palpitation confirms placement by provider and patient: yes    Site was closed with steri-strips and pressure bandage applied: yes      CC: NEXPLANON INSERTION      PRE-NEXPLANON INSERTION COUNSELING:  All contraceptive options were reviewed and the patient chooses Nexplanon.  Patients history was reviewed and there were no contraindications to Nexplanon.  The procedure and minimal risks of pain, bleeding, bruising and infection at the insertion site discussed. Possible irregular menstrual bleeding pattern versus amenorrhea was explained.  No protection against STDs discussed.  Written information provided; all questions answered and patient agrees to proceed.  Consent signed and scanned into computer.      Vitals:    08/22/23 0908   BP: 114/80       UPT neg    EXAM:  With patient in supine position the nondominant arm was flexed at the elbow and externally rotated.  The insertion site was identified 8-10 cm from the medical epicondyle and 3-5cm below sulcus.    PROCEDURE:  TIME OUT PERFORMED.  The insertion site was prepped with antiseptic and injected with 4 cc of 1% Xylocaine without epinephrine  subq along the planned insertion canal.  Using sterile technique the Nexplanon applicator was visually verified and removed from the blister pack.  The Transparent Protection Cap was removed by sliding it horizontally in the direction of the arrow away from the needle.  The white colored implant was visualized by looking into the tip of the needle.   The needle tip was inserted bevel side up at a 30 degree angle to penetrate the skin.  The applicator was lowered parallel to the arm and the skin was tented with the needle.  While lifting skin with the needle, the needle was inserted to its full length.  Keeping the applicator in place, the purple slider was unlocked by pushing it slightly down.  The slider was then moved fully back until it stopped.  The applicator was then removed. The Needle was noted to be fully retracted and only the purple tip of the obturator was visible.  The implant was palpable beneath the skin after insertion.  A small adhesive bandage and then a pressure bandage was placed over the insertion site.  The patient tolerated the procedure well.    ASSESSMENT:  1. Contraception management / Nexplanon insertion.V25.0.    POST NEXPLANON INSERTION COUNSELING:  Manage post nexplanon placement arm pain with NSAIDs, Tylenol or Rx per MedCard.  Keep arm elevated and apply intermittent ice packs to decrease pain and bruising for 24 Hours.  May remove bandage in 24 hours.  Nexplanon danger signs (worsening pain at insertion site, bleeding through bandage, redness and/or pus drainage at insertion site).  Back up contraception for first 7 days  Removal in 3 years.

## 2023-11-25 ENCOUNTER — HOSPITAL ENCOUNTER (EMERGENCY)
Facility: OTHER | Age: 18
Discharge: HOME | End: 2023-11-25
Attending: EMERGENCY MEDICINE
Payer: MEDICAID

## 2023-11-25 VITALS
RESPIRATION RATE: 20 BRPM | HEART RATE: 77 BPM | HEIGHT: 67 IN | WEIGHT: 214.5 LBS | TEMPERATURE: 98 F | DIASTOLIC BLOOD PRESSURE: 59 MMHG | OXYGEN SATURATION: 99 % | SYSTOLIC BLOOD PRESSURE: 117 MMHG | BODY MASS INDEX: 33.67 KG/M2

## 2023-11-25 DIAGNOSIS — N39.0 URINARY TRACT INFECTION WITHOUT HEMATURIA, SITE UNSPECIFIED: ICD-10-CM

## 2023-11-25 DIAGNOSIS — R30.0 DYSURIA: Primary | ICD-10-CM

## 2023-11-25 LAB
B-HCG UR QL: NEGATIVE
BACTERIA #/AREA URNS HPF: ABNORMAL /HPF
BILIRUB UR QL STRIP: NEGATIVE
CLARITY UR: ABNORMAL
COLOR UR: YELLOW
CTP QC/QA: YES
GLUCOSE UR QL STRIP: NEGATIVE
HGB UR QL STRIP: ABNORMAL
KETONES UR QL STRIP: NEGATIVE
LEUKOCYTE ESTERASE UR QL STRIP: ABNORMAL
MICROSCOPIC COMMENT: ABNORMAL
NITRITE UR QL STRIP: NEGATIVE
PH UR STRIP: 6 [PH] (ref 5–8)
PROT UR QL STRIP: NEGATIVE
RBC #/AREA URNS HPF: 2 /HPF (ref 0–4)
SP GR UR STRIP: 1.02 (ref 1–1.03)
SQUAMOUS #/AREA URNS HPF: 3 /HPF
URN SPEC COLLECT METH UR: ABNORMAL
UROBILINOGEN UR STRIP-ACNC: NEGATIVE EU/DL
WBC #/AREA URNS HPF: 41 /HPF (ref 0–5)

## 2023-11-25 PROCEDURE — 81025 URINE PREGNANCY TEST: CPT | Performed by: EMERGENCY MEDICINE

## 2023-11-25 PROCEDURE — 87088 URINE BACTERIA CULTURE: CPT | Performed by: EMERGENCY MEDICINE

## 2023-11-25 PROCEDURE — 87077 CULTURE AEROBIC IDENTIFY: CPT | Performed by: EMERGENCY MEDICINE

## 2023-11-25 PROCEDURE — 81000 URINALYSIS NONAUTO W/SCOPE: CPT | Performed by: EMERGENCY MEDICINE

## 2023-11-25 PROCEDURE — 87186 SC STD MICRODIL/AGAR DIL: CPT | Performed by: EMERGENCY MEDICINE

## 2023-11-25 PROCEDURE — 99284 EMERGENCY DEPT VISIT MOD MDM: CPT

## 2023-11-25 PROCEDURE — 87086 URINE CULTURE/COLONY COUNT: CPT | Performed by: EMERGENCY MEDICINE

## 2023-11-25 RX ORDER — NITROFURANTOIN 25; 75 MG/1; MG/1
100 CAPSULE ORAL 2 TIMES DAILY
Qty: 10 CAPSULE | Refills: 0 | Status: SHIPPED | OUTPATIENT
Start: 2023-11-25 | End: 2023-11-30

## 2023-11-25 RX ORDER — PHENAZOPYRIDINE HYDROCHLORIDE 200 MG/1
200 TABLET, FILM COATED ORAL 3 TIMES DAILY
Qty: 6 TABLET | Refills: 0 | Status: SHIPPED | OUTPATIENT
Start: 2023-11-25 | End: 2023-11-27

## 2023-11-25 NOTE — ED PROVIDER NOTES
"     Source of History:  The patient    Chief complaint:  Dysuria (Pain with urination x 2 days)      HPI:  Kaye Gunn is a 18 y.o. female presenting with complaint of dysuria and increased frequency and pressure that started today.  Also states it strong smelling.  Denies any vaginal bleeding or discharge.  States she had protected sex yesterday.  Has had a urinary tract infections in the past and this feels similar.  Denies any back pain, vomiting or fevers.    This is the extent to the patients complaints today here in the emergency department.    ROS:   See HPI    Review of patient's allergies indicates:  No Known Allergies    PMH:  As per HPI and below:  No past medical history on file.  No past surgical history on file.    Social History     Tobacco Use    Smoking status: Never    Smokeless tobacco: Never   Substance Use Topics    Alcohol use: Yes     Comment: socially    Drug use: Yes     Types: Marijuana     Comment: rarely       Physical Exam:    BP (!) 117/59   Pulse 77   Temp 98.3 °F (36.8 °C)   Resp 20   Ht 5' 7" (1.702 m)   Wt 97.3 kg (214 lb 8.1 oz)   SpO2 99%   BMI 33.60 kg/m²   Nursing note and vital signs reviewed.  Constitutional: No acute distress.  Nontoxic  Eyes: No conjunctival injection. Extraocular muscles intact.  Musculoskeletal: Good range of motion all joints.  No deformities.  Neck supple.  No meningismus. Neurovascularly intact.      Summary of Medical Records:      MDM/ Differential Dx:   18-year-old female presents with dysuria.  Will check urinalysis.  She denies any vaginal discharge to suggest sexually transmitted disease and states she is not had unprotected sex in a long time.  No evidence of pyelonephritis.    ED Course as of 11/25/23 0554   Sat Nov 25, 2023   0537 Preg Test, Ur: Negative [SM]   0552 NITRITE UA: Negative [SM]   0552 Leukocytes, UA(!): 1+ [SM]   0553 WBC, UA(!): 41 [SM]   0553 Bacteria, UA(!): Moderate [SM]   0553 Will treat as acute cystitis.  "     No further workup is indicated in the emergency department today.  I updated pt regarding results and I counseled pt regarding supportive care measures.  Diagnosis and treatment plan explained to patient. I have answered all questions and the patient is satisfied with the plan of care. Patient discharged home in stable condition.  [SM]      ED Course User Index  [SM] Art Penn,                Diagnostic Impression:    1. Dysuria    2. Urinary tract infection without hematuria, site unspecified         ED Disposition Condition    Discharge Stable            ED Prescriptions       Medication Sig Dispense Start Date End Date Auth. Provider    phenazopyridine (PYRIDIUM) 200 MG tablet Take 1 tablet (200 mg total) by mouth 3 (three) times daily. for 2 days 6 tablet 11/25/2023 11/27/2023 Art Penn,     nitrofurantoin, macrocrystal-monohydrate, (MACROBID) 100 MG capsule Take 1 capsule (100 mg total) by mouth 2 (two) times daily. for 5 days 10 capsule 11/25/2023 11/30/2023 Art Penn, DO          Follow-up Information       Follow up With Specialties Details Why Contact [facility], [facility] Internal Medicine, Family Medicine  to establish primary care [address] [address]  [phone]               Art Penn,   11/25/23 0472

## 2023-11-25 NOTE — ED TRIAGE NOTES
Patient complaining of having vaginal discomfort with dysuria that started today. States having protected sex yesterday. Denies any vaginal discharge, back pain, abdominal pain, nausea, vomiting, or diarrhea.

## 2023-11-27 ENCOUNTER — PATIENT OUTREACH (OUTPATIENT)
Dept: EMERGENCY MEDICINE | Facility: OTHER | Age: 18
End: 2023-11-27
Payer: MEDICAID

## 2023-11-27 LAB — BACTERIA UR CULT: ABNORMAL

## 2023-11-27 NOTE — PROGRESS NOTES
Lorena Hanna LPN  ED Navigator  Emergency Department    Project: [facility] ED Navigator  Role: Community Health Worker    Date: 11/27/2023  Patient Name: Kaye Gunn  MRN: [id number]  PCP: No, Primary Doctor    Assessment:     Kaye Gunn is a 18 y.o. female who has presented to ED for Dysuria. Patient has visited the ED 1 times in the past 3 months. Patient did not contact PCP.     ED Navigator Initial Assessment    ED Navigator Enrollment Documentation  Consent to Services  Does patient consent to completing the assessment?: Yes  Contact  Method of Initial Contact: Phone  Transportation  Does the patient have issues with Transportation?: No  Does the patient have transportation to and from healthcare appointments?: Yes  Insurance Coverage  Do you have coverage/adequate coverage?: Yes  Type/kind of coverage: Healthy Blue  Is patient able to afford co-pays/deductibles?: Yes  Is patient able to afford HME or supplies?: Yes  Does patient have an established [facility] PCP?: No  Does patient need assistance finding a PCP?: Yes  Does the patient have a lack of adequate coverage?: No  Specialist Appointment  Did the patient come to the ED to see a specialist?: No  Does the patient have a pending specialist referral?: No  Does the patient have a specialist appointment made?: No  PCP Follow Up Appointment  Has the patient had an appointment with a primary care provider in the past year?: No  Does the patient have a follow up appontment with a PCP?: No  When was the last time you saw your PCP?: 11/27/22  Why does the patient not have a follow up scheduled?: No established [facility]/outside PCP  Would you like an [facility] PCP?: Yes  Medications  Is patient able to afford medication?: Yes  Is patient unable to get medication due to lack of transportation?: No  Psychological  Does the patient have psycho-social concerns?: No  Food  Does the patient have concerns about food?: No  Communication/Education  Does the patient have  limited English proficiency/English not primary language?: No  Does patient have low literacy and/or low health literacy?: Yes  Does patient have concerns with care?: No  Does patient have dissatisfaction with care?: No  Other Financial Concerns  Does the patient have immediate financial distress?: No  Does the patient have general financial concerns?: No  Other Social Barriers/Concerns  Does the patient have any additional barriers or concerns?: None  Primary Barrier  Barriers identified: Cognitive barrier (health literacy, language and communication, etc.)  Root Cause of ED Utilization: Lack of Access to Primary Care  Plan to address Lack of Access to Primary Care: Provided patient with information about the [facility] in their area, Provided [facility] PCP assistance line [phone], Provided information for [facility] ([facility], [facility] etc.), Provided information for [facility] On Call 24/7 Nurse Triage line [phone] or 1-866-[facility] ([phone])  Next steps: Provided Education  Was education/educational materials provided surrounding PCP services/creating a medical home?: Yes Was education verbal or written?: Verbal, Written     Was education/educational materials provided surrounding low cost, healthy foods?: Yes Was education verbal or written?: Written     Was education/educational materials provided surrounding other items? If so, use comment to explain.: Yes (Comment: [place] virtual visit flyer, eating healthy plate, [facility], [facility] on call RN#, [facility] PCP scheduling assistance) Was education verbal or written?: Written   Plan: Provided information for [facility] ([facility], [facility]Etc..)  Expected Date of Follow Up 1: 3/27/24  Additional Documentation: Spoke with patient today and she was agreeable to enrollment and  subsequent F/U calls. Pt. Denies any psychosocial needs at this time. Pt. Is not established with primary care and would like assistance with making an appointment at the [facility] to become established. Appointment request sent to NALLELY Roman at the clinic. Pt. Denies smoking. Pt. Drinks only on occasion socially. Pt. Denied the need for any outside community resources at this time. [facility] form, [facility] Virtual Visit Flyer, [facility] PCP scheduling assistance, [facility] on call RN#, and Heart Healthy Diet education all sent to e-mail.         Social History     Socioeconomic History    Marital status: Single   Tobacco Use    Smoking status: Never    Smokeless tobacco: Never   Substance and Sexual Activity    Alcohol use: Yes     Comment: socially    Drug use: Yes     Types: Marijuana     Comment: rarely    Sexual activity: Yes     Partners: Male     Birth control/protection: None     Social Determinants of Health     Financial Resource Strain: Low Risk  (11/27/2023)    Overall Financial Resource Strain (CARDIA)     Difficulty of Paying Living Expenses: Not hard at all   Food Insecurity: No Food Insecurity (11/27/2023)    Hunger Vital Sign     Worried About Running Out of Food in the Last Year: Never true     Ran Out of Food in the Last Year: Never true   Transportation Needs: No Transportation Needs (11/27/2023)    PRAPARE - Transportation     Lack of Transportation (Medical): No     Lack of Transportation (Non-Medical): No   Stress: No Stress Concern Present (11/27/2023)    [place] [employer] - Occupational Stress Questionnaire     Feeling of Stress : Not at all   Social Connections: Unknown (11/27/2023)    Social Connection and Isolation Panel [NHANES]     Marital Status: Never    Housing Stability: Unknown (11/27/2023)    Housing Stability Vital Sign     Unable to Pay for Housing in the Last Year: No     Unstable Housing in the Last Year: No       Plan:   Spoke with patient  today and she was agreeable to enrollment and subsequent F/U calls. Pt. Denies any psychosocial needs at this time. Pt. Is not established with primary care and would like assistance with making an appointment at the [facility] to become established. Appointment request sent to NALLELY Roman at the clinic. Pt. Denies smoking. Pt. Drinks only on occasion socially. Pt. Denied the need for any outside community resources at this time. [facility] form, [place] Virtual Visit Flyer, [facility] PCP scheduling assistance, [facility] on call RN#, and Heart Healthy Diet education all sent to e-mail.

## 2023-12-08 ENCOUNTER — TELEPHONE (OUTPATIENT)
Dept: OBSTETRICS AND GYNECOLOGY | Facility: CLINIC | Age: 18
End: 2023-12-08
Payer: MEDICAID

## 2023-12-08 DIAGNOSIS — R30.0 DYSURIA: Primary | ICD-10-CM

## 2023-12-08 NOTE — TELEPHONE ENCOUNTER
Spoke woth patient advised patient she can go to LAB and drop off a urine sample   Patient understands - patient was also advised if any fever she would need to report back to ER   Patient stated she understands.

## 2023-12-08 NOTE — TELEPHONE ENCOUNTER
----- Message from Noe Mejia NP sent at 12/8/2023  4:20 PM CST -----  Regarding: RE: Sooner Appt Request  Order for urine culture placed. If any fever needs to go back to ER. If any vaginal symptoms needs in person appt    ----- Message -----  From: Ebonie Cheung MA  Sent: 12/8/2023   4:02 PM CST  To: Noe Mejia NP  Subject: FW: Sooner Appt Request                          Please advise   ----- Message -----  From: Seda Tracy  Sent: 12/8/2023   2:44 PM CST  To: Roberto Liu Staff  Subject: Sooner Appt Request                              Who Is Calling : SURESH SERVIN [[id number]]        Reason For The Call: Patient is requesting a sooner appointment.  Patient declined first available and does not want to be added to the waitlist.  Please contact the patient to schedule.        Preferred Contact Method:  [phone]        Additional Information: Possible UTI

## 2023-12-11 ENCOUNTER — OFFICE VISIT (OUTPATIENT)
Dept: PRIMARY CARE CLINIC | Facility: CLINIC | Age: 18
End: 2023-12-11
Payer: MEDICAID

## 2023-12-11 VITALS
HEIGHT: 68 IN | BODY MASS INDEX: 32.98 KG/M2 | SYSTOLIC BLOOD PRESSURE: 117 MMHG | DIASTOLIC BLOOD PRESSURE: 75 MMHG | WEIGHT: 217.63 LBS | HEART RATE: 72 BPM

## 2023-12-11 DIAGNOSIS — N39.0 RECURRENT UTI: ICD-10-CM

## 2023-12-11 DIAGNOSIS — Z11.3 SCREENING EXAMINATION FOR STD (SEXUALLY TRANSMITTED DISEASE): Primary | ICD-10-CM

## 2023-12-11 DIAGNOSIS — R30.0 DYSURIA: ICD-10-CM

## 2023-12-11 LAB
BILIRUB SERPL-MCNC: NORMAL MG/DL
BLOOD, POC UA: NORMAL
GLUCOSE UR QL STRIP: NORMAL
KETONES UR QL STRIP: NORMAL
LEUKOCYTE ESTERASE URINE, POC: NORMAL
NITRITE, POC UA: NORMAL
PH, POC UA: 5
PROTEIN, POC: NORMAL
SPECIFIC GRAVITY, POC UA: 1015
UROBILINOGEN, POC UA: NORMAL

## 2023-12-11 PROCEDURE — 99999PBSHW POCT URINALYSIS: ICD-10-PCS | Mod: PBBFAC,,,

## 2023-12-11 PROCEDURE — 3008F BODY MASS INDEX DOCD: CPT | Mod: CPTII,,, | Performed by: STUDENT IN AN ORGANIZED HEALTH CARE EDUCATION/TRAINING PROGRAM

## 2023-12-11 PROCEDURE — 99204 OFFICE O/P NEW MOD 45 MIN: CPT | Mod: S$PBB,,, | Performed by: STUDENT IN AN ORGANIZED HEALTH CARE EDUCATION/TRAINING PROGRAM

## 2023-12-11 PROCEDURE — 87491 CHLMYD TRACH DNA AMP PROBE: CPT | Performed by: STUDENT IN AN ORGANIZED HEALTH CARE EDUCATION/TRAINING PROGRAM

## 2023-12-11 PROCEDURE — 81003 URINALYSIS AUTO W/O SCOPE: CPT | Mod: PBBFAC,PN | Performed by: STUDENT IN AN ORGANIZED HEALTH CARE EDUCATION/TRAINING PROGRAM

## 2023-12-11 PROCEDURE — 99213 OFFICE O/P EST LOW 20 MIN: CPT | Mod: PBBFAC,PN | Performed by: STUDENT IN AN ORGANIZED HEALTH CARE EDUCATION/TRAINING PROGRAM

## 2023-12-11 PROCEDURE — 81514 NFCT DS BV&VAGINITIS DNA ALG: CPT | Performed by: STUDENT IN AN ORGANIZED HEALTH CARE EDUCATION/TRAINING PROGRAM

## 2023-12-11 PROCEDURE — 87077 CULTURE AEROBIC IDENTIFY: CPT | Performed by: STUDENT IN AN ORGANIZED HEALTH CARE EDUCATION/TRAINING PROGRAM

## 2023-12-11 PROCEDURE — 3008F PR BODY MASS INDEX (BMI) DOCUMENTED: ICD-10-PCS | Mod: CPTII,,, | Performed by: STUDENT IN AN ORGANIZED HEALTH CARE EDUCATION/TRAINING PROGRAM

## 2023-12-11 PROCEDURE — 99204 PR OFFICE/OUTPT VISIT, NEW, LEVL IV, 45-59 MIN: ICD-10-PCS | Mod: S$PBB,,, | Performed by: STUDENT IN AN ORGANIZED HEALTH CARE EDUCATION/TRAINING PROGRAM

## 2023-12-11 PROCEDURE — 1159F PR MEDICATION LIST DOCUMENTED IN MEDICAL RECORD: ICD-10-PCS | Mod: CPTII,,, | Performed by: STUDENT IN AN ORGANIZED HEALTH CARE EDUCATION/TRAINING PROGRAM

## 2023-12-11 PROCEDURE — 87591 N.GONORRHOEAE DNA AMP PROB: CPT | Performed by: STUDENT IN AN ORGANIZED HEALTH CARE EDUCATION/TRAINING PROGRAM

## 2023-12-11 PROCEDURE — 99999 PR PBB SHADOW E&M-EST. PATIENT-LVL III: CPT | Mod: PBBFAC,,, | Performed by: STUDENT IN AN ORGANIZED HEALTH CARE EDUCATION/TRAINING PROGRAM

## 2023-12-11 PROCEDURE — 3074F PR MOST RECENT SYSTOLIC BLOOD PRESSURE < 130 MM HG: ICD-10-PCS | Mod: CPTII,,, | Performed by: STUDENT IN AN ORGANIZED HEALTH CARE EDUCATION/TRAINING PROGRAM

## 2023-12-11 PROCEDURE — 1159F MED LIST DOCD IN RCRD: CPT | Mod: CPTII,,, | Performed by: STUDENT IN AN ORGANIZED HEALTH CARE EDUCATION/TRAINING PROGRAM

## 2023-12-11 PROCEDURE — 87088 URINE BACTERIA CULTURE: CPT | Performed by: STUDENT IN AN ORGANIZED HEALTH CARE EDUCATION/TRAINING PROGRAM

## 2023-12-11 PROCEDURE — 87086 URINE CULTURE/COLONY COUNT: CPT | Performed by: STUDENT IN AN ORGANIZED HEALTH CARE EDUCATION/TRAINING PROGRAM

## 2023-12-11 PROCEDURE — 3078F PR MOST RECENT DIASTOLIC BLOOD PRESSURE < 80 MM HG: ICD-10-PCS | Mod: CPTII,,, | Performed by: STUDENT IN AN ORGANIZED HEALTH CARE EDUCATION/TRAINING PROGRAM

## 2023-12-11 PROCEDURE — 99999 PR PBB SHADOW E&M-EST. PATIENT-LVL III: ICD-10-PCS | Mod: PBBFAC,,, | Performed by: STUDENT IN AN ORGANIZED HEALTH CARE EDUCATION/TRAINING PROGRAM

## 2023-12-11 PROCEDURE — 3074F SYST BP LT 130 MM HG: CPT | Mod: CPTII,,, | Performed by: STUDENT IN AN ORGANIZED HEALTH CARE EDUCATION/TRAINING PROGRAM

## 2023-12-11 PROCEDURE — 99999PBSHW POCT URINALYSIS: Mod: PBBFAC,,,

## 2023-12-11 PROCEDURE — 87186 SC STD MICRODIL/AGAR DIL: CPT | Performed by: STUDENT IN AN ORGANIZED HEALTH CARE EDUCATION/TRAINING PROGRAM

## 2023-12-11 PROCEDURE — 3078F DIAST BP <80 MM HG: CPT | Mod: CPTII,,, | Performed by: STUDENT IN AN ORGANIZED HEALTH CARE EDUCATION/TRAINING PROGRAM

## 2023-12-11 RX ORDER — SULFAMETHOXAZOLE AND TRIMETHOPRIM 800; 160 MG/1; MG/1
1 TABLET ORAL 2 TIMES DAILY
Qty: 10 TABLET | Refills: 0 | Status: SHIPPED | OUTPATIENT
Start: 2023-12-11 | End: 2023-12-16

## 2023-12-11 NOTE — PROGRESS NOTES
12/11/2023    Kaye Gunn  [id number]    Chief Complaint   Patient presents with    Annual Exam    Urinary Tract Infection       HPI  This pt is new to me and presents for STD screening and UTI concern. No sig Pmh.     Concerned that she has another UTI. Describes dysuria and malodor the day after intercourse. These symptoms have been present for 3-4 days. Of note, patient dx with e coli UTI 3 weeks ago and completed course of macrobid. Patient states that her symptoms resolved.       Negative 10 point ROS outside of HPI    Social History     Socioeconomic History    Marital status: Single   Tobacco Use    Smoking status: Never    Smokeless tobacco: Never   Substance and Sexual Activity    Alcohol use: Yes     Comment: socially    Drug use: Yes     Types: Marijuana     Comment: rarely    Sexual activity: Yes     Partners: Male     Birth control/protection: None     Social Determinants of Health     Financial Resource Strain: Low Risk  (11/27/2023)    Overall Financial Resource Strain (CARDIA)     Difficulty of Paying Living Expenses: Not hard at all   Food Insecurity: No Food Insecurity (11/27/2023)    Hunger Vital Sign     Worried About Running Out of Food in the Last Year: Never true     Ran Out of Food in the Last Year: Never true   Transportation Needs: No Transportation Needs (11/27/2023)    PRAPARE - Transportation     Lack of Transportation (Medical): No     Lack of Transportation (Non-Medical): No   Stress: No Stress Concern Present (11/27/2023)    [place] [employer] - Occupational Stress Questionnaire     Feeling of Stress : Not at all   Social Connections: Unknown (11/27/2023)    Social Connection and Isolation Panel [NHANES]     Marital Status: Never    Housing Stability: Unknown (11/27/2023)    Housing Stability Vital Sign     Unable to Pay for Housing in the Last Year: No     Unstable Housing in the Last Year: No           Current Outpatient Medications:     fluconazole  (DIFLUCAN) 150 MG Tab, Take 1 tablet (150 mg total) by mouth every 72 hours as needed (vaginal itching/discharge). (Patient not taking: Reported on 8/22/2023), Disp: 2 tablet, Rfl: 0    hydrocortisone valerate (WEST-ALVIN) 0.2 % ointment, Apply topically 2 (two) times daily. for 10 days, Disp: 15 g, Rfl: 0    Current Facility-Administered Medications:     etonogestreL subdermal device 68 mg, 68 mg, Implant, , DungrNoe, NP, 68 mg at 08/22/23 0900      Physical Exam  Vitals:    12/11/23 1107   BP: 117/75   Pulse: 72       Physical Exam      Gen: well appearing, NAD  Resp: non labored breathing, no crackles, no wheezes, CTAB  CV: RRR no murmur, gallops, rubs, no LE edema  Abd: soft nontender BS present no organomegaly  No CVA or suprapubic     1. Screening examination for STD (sexually transmitted disease)  - C. trachomatis/N. gonorrhoeae by AMP DNA  - VAGINOSIS SCREEN BY DNA PROBE  - HIV 1/2 Ag/Ab (4th Gen); Future  - RPR; Future    2. Dysuria  - POCT URINALYSIS leuk and nitrite +    3. Recurrent UTI  - Urine culture   Last with pan sensitive e.coli  -START sulfamethoxazole-trimethoprim 800-160mg (BACTRIM DS) 800-160 mg Tab; Take 1 tablet by mouth 2 (two) times daily. for 5 days  Dispense: 10 tablet; Refill: 0    RTC as needed for routine care    Brigitte Aaron MD  Family Medicine

## 2023-12-12 ENCOUNTER — TELEPHONE (OUTPATIENT)
Dept: PRIMARY CARE CLINIC | Facility: CLINIC | Age: 18
End: 2023-12-12
Payer: MEDICAID

## 2023-12-12 DIAGNOSIS — A74.9 CHLAMYDIA: Primary | ICD-10-CM

## 2023-12-12 LAB
C TRACH DNA SPEC QL NAA+PROBE: DETECTED
N GONORRHOEA DNA SPEC QL NAA+PROBE: NOT DETECTED

## 2023-12-12 RX ORDER — DOXYCYCLINE 100 MG/1
100 CAPSULE ORAL EVERY 12 HOURS
Qty: 10 CAPSULE | Refills: 0 | Status: SHIPPED | OUTPATIENT
Start: 2023-12-12 | End: 2023-12-19

## 2023-12-12 NOTE — TELEPHONE ENCOUNTER
----- Message from Brigitte Aaron MD sent at 12/12/2023  8:06 AM CST -----  Please have patient stop the antibiotics that was prescribed yesterday. I am sending a different antibiotics to her pharmacy, doxycycline. She needs to take all of them. It is important that she let her partner know, so that they may be treated as well and avoid re-infection.

## 2023-12-13 ENCOUNTER — TELEPHONE (OUTPATIENT)
Dept: PRIMARY CARE CLINIC | Facility: CLINIC | Age: 18
End: 2023-12-13
Payer: MEDICAID

## 2023-12-13 LAB
BACTERIA UR CULT: ABNORMAL
BACTERIAL VAGINOSIS DNA: NEGATIVE
CANDIDA GLABRATA DNA: NEGATIVE
CANDIDA KRUSEI DNA: NEGATIVE
CANDIDA RRNA VAG QL PROBE: POSITIVE
T VAGINALIS RRNA GENITAL QL PROBE: NEGATIVE

## 2023-12-13 NOTE — TELEPHONE ENCOUNTER
----- Message from Tank Quiñones sent at 12/12/2023  2:00 PM CST -----  Contact: Pt [phone]  Patient is returning a phone call.  Who left a message for the patient: Sarah Peguero  Does patient know what this is regarding:  Yes   Would you like a call back, or a response through your [facility] portal?: call  Comments:

## 2024-02-08 ENCOUNTER — OFFICE VISIT (OUTPATIENT)
Dept: OBSTETRICS AND GYNECOLOGY | Facility: CLINIC | Age: 19
End: 2024-02-08
Payer: MEDICAID

## 2024-02-08 VITALS
DIASTOLIC BLOOD PRESSURE: 77 MMHG | HEIGHT: 68 IN | SYSTOLIC BLOOD PRESSURE: 120 MMHG | WEIGHT: 216.5 LBS | BODY MASS INDEX: 32.81 KG/M2

## 2024-02-08 DIAGNOSIS — N89.8 PROBLEMATIC VAGINAL DISCHARGE: Primary | ICD-10-CM

## 2024-02-08 DIAGNOSIS — R30.0 DYSURIA: ICD-10-CM

## 2024-02-08 PROCEDURE — 1160F RVW MEDS BY RX/DR IN RCRD: CPT | Mod: CPTII,,, | Performed by: STUDENT IN AN ORGANIZED HEALTH CARE EDUCATION/TRAINING PROGRAM

## 2024-02-08 PROCEDURE — 87086 URINE CULTURE/COLONY COUNT: CPT | Performed by: STUDENT IN AN ORGANIZED HEALTH CARE EDUCATION/TRAINING PROGRAM

## 2024-02-08 PROCEDURE — 87077 CULTURE AEROBIC IDENTIFY: CPT | Performed by: STUDENT IN AN ORGANIZED HEALTH CARE EDUCATION/TRAINING PROGRAM

## 2024-02-08 PROCEDURE — 99999 PR PBB SHADOW E&M-EST. PATIENT-LVL II: CPT | Mod: PBBFAC,,, | Performed by: STUDENT IN AN ORGANIZED HEALTH CARE EDUCATION/TRAINING PROGRAM

## 2024-02-08 PROCEDURE — 3078F DIAST BP <80 MM HG: CPT | Mod: CPTII,,, | Performed by: STUDENT IN AN ORGANIZED HEALTH CARE EDUCATION/TRAINING PROGRAM

## 2024-02-08 PROCEDURE — 87491 CHLMYD TRACH DNA AMP PROBE: CPT | Performed by: STUDENT IN AN ORGANIZED HEALTH CARE EDUCATION/TRAINING PROGRAM

## 2024-02-08 PROCEDURE — 87088 URINE BACTERIA CULTURE: CPT | Performed by: STUDENT IN AN ORGANIZED HEALTH CARE EDUCATION/TRAINING PROGRAM

## 2024-02-08 PROCEDURE — 1159F MED LIST DOCD IN RCRD: CPT | Mod: CPTII,,, | Performed by: STUDENT IN AN ORGANIZED HEALTH CARE EDUCATION/TRAINING PROGRAM

## 2024-02-08 PROCEDURE — 81514 NFCT DS BV&VAGINITIS DNA ALG: CPT | Performed by: STUDENT IN AN ORGANIZED HEALTH CARE EDUCATION/TRAINING PROGRAM

## 2024-02-08 PROCEDURE — 99212 OFFICE O/P EST SF 10 MIN: CPT | Mod: PBBFAC | Performed by: STUDENT IN AN ORGANIZED HEALTH CARE EDUCATION/TRAINING PROGRAM

## 2024-02-08 PROCEDURE — 3008F BODY MASS INDEX DOCD: CPT | Mod: CPTII,,, | Performed by: STUDENT IN AN ORGANIZED HEALTH CARE EDUCATION/TRAINING PROGRAM

## 2024-02-08 PROCEDURE — 87186 SC STD MICRODIL/AGAR DIL: CPT | Performed by: STUDENT IN AN ORGANIZED HEALTH CARE EDUCATION/TRAINING PROGRAM

## 2024-02-08 PROCEDURE — 99213 OFFICE O/P EST LOW 20 MIN: CPT | Mod: S$PBB,,, | Performed by: STUDENT IN AN ORGANIZED HEALTH CARE EDUCATION/TRAINING PROGRAM

## 2024-02-08 PROCEDURE — 3074F SYST BP LT 130 MM HG: CPT | Mod: CPTII,,, | Performed by: STUDENT IN AN ORGANIZED HEALTH CARE EDUCATION/TRAINING PROGRAM

## 2024-02-08 RX ORDER — METRONIDAZOLE 500 MG/1
TABLET ORAL
Qty: 14 TABLET | Refills: 0 | Status: SHIPPED | OUTPATIENT
Start: 2024-02-08

## 2024-02-08 NOTE — PROGRESS NOTES
"  Subjective:      Patient ID: Kaye Gunn is a 18 y.o. female.    Chief Complaint:  Dysuria and Vaginal Discharge (White  and some odor.)      History of Present Illness  17 yo G0 presents for evaluation of vaginal discharge and odor, dysuria    Reports symptoms have been present for about 1 week and odor is increasing. Urine smells like "old person"  Was sexually active with a male partner in Dec, now with a female partner. Has been having oral sex and using toys, reports washing between uses       GYN & OB History  No LMP recorded. Patient has had an implant.   Date of Last Pap: No result found    OB History    Para Term  AB Living   0 0 0 0 0 0   SAB IAB Ectopic Multiple Live Births   0 0 0 0 0       Review of Systems  Review of Systems   Genitourinary:  Positive for dysuria, vaginal discharge and vaginal odor.   All other systems reviewed and are negative.       Objective:     Physical Exam:   Constitutional: She appears well-developed and well-nourished.    HENT:   Head: Normocephalic and atraumatic.    Eyes: EOM are normal.      Pulmonary/Chest: Effort normal.        Abdominal: Soft.     Genitourinary:    Right adnexa and left adnexa normal.      Pelvic exam was performed with patient in the lithotomy position.   The external female genitalia was normal.   Genitalia hair distrobution normal .   There is no lesion on the right labia. There is no lesion on the left labia. Cervix is normal. There is vaginal discharge (thick white, odorous) in the vagina.    Genitourinary Comments: Chaperone present for duration of exam             Musculoskeletal: Normal range of motion.       Neurological: She is alert.    Skin: Skin is warm and dry.    Psychiatric: She has a normal mood and affect.         Assessment:     1. Problematic vaginal discharge    2. Dysuria         Plan:     1. Problematic vaginal discharge  - C. trachomatis/N. gonorrhoeae by AMP DNA [facility]; Cervix  - Vaginosis Screen by DNA " Probe  - metroNIDAZOLE (FLAGYL) 500 MG tablet; Take one pill twice a day for 7 days. Avoid alcohol while taking this medication  Dispense: 14 tablet; Refill: 0    2. Dysuria  - Neg for nitrites, pt has Azo at home she will take for sx relief  - Urine culture      Follow up as needed    Alfredo Frausto III, MD  [facility] - OB GYN   [address]  [facility] [place] [address] [phone]

## 2024-02-10 LAB — BACTERIA UR CULT: ABNORMAL

## 2024-02-12 LAB
BACTERIAL VAGINOSIS DNA: NEGATIVE
CANDIDA GLABRATA DNA: NEGATIVE
CANDIDA KRUSEI DNA: NEGATIVE
CANDIDA RRNA VAG QL PROBE: POSITIVE
T VAGINALIS RRNA GENITAL QL PROBE: NEGATIVE

## 2024-02-14 LAB
C TRACH DNA SPEC QL NAA+PROBE: NOT DETECTED
N GONORRHOEA DNA SPEC QL NAA+PROBE: NOT DETECTED

## 2024-02-18 ENCOUNTER — PATIENT MESSAGE (OUTPATIENT)
Dept: OBSTETRICS AND GYNECOLOGY | Facility: CLINIC | Age: 19
End: 2024-02-18
Payer: MEDICAID

## 2024-02-19 DIAGNOSIS — B37.31 VAGINAL CANDIDIASIS: Primary | ICD-10-CM

## 2024-02-19 DIAGNOSIS — N30.00 ACUTE CYSTITIS WITHOUT HEMATURIA: Primary | ICD-10-CM

## 2024-02-19 RX ORDER — FLUCONAZOLE 150 MG/1
150 TABLET ORAL DAILY
Qty: 2 TABLET | Refills: 0 | Status: SHIPPED | OUTPATIENT
Start: 2024-02-19 | End: 2024-02-21

## 2024-02-19 RX ORDER — SULFAMETHOXAZOLE AND TRIMETHOPRIM 800; 160 MG/1; MG/1
1 TABLET ORAL 2 TIMES DAILY
Qty: 10 TABLET | Refills: 0 | Status: SHIPPED | OUTPATIENT
Start: 2024-02-19 | End: 2024-02-24

## 2024-04-17 ENCOUNTER — OFFICE VISIT (OUTPATIENT)
Dept: OBSTETRICS AND GYNECOLOGY | Facility: CLINIC | Age: 19
End: 2024-04-17
Payer: MEDICAID

## 2024-04-17 VITALS
BODY MASS INDEX: 32.84 KG/M2 | DIASTOLIC BLOOD PRESSURE: 73 MMHG | WEIGHT: 216.69 LBS | HEIGHT: 68 IN | SYSTOLIC BLOOD PRESSURE: 121 MMHG

## 2024-04-17 DIAGNOSIS — R30.0 DYSURIA: Primary | ICD-10-CM

## 2024-04-17 DIAGNOSIS — N76.0 ACUTE VULVOVAGINITIS: ICD-10-CM

## 2024-04-17 PROCEDURE — 87077 CULTURE AEROBIC IDENTIFY: CPT | Performed by: STUDENT IN AN ORGANIZED HEALTH CARE EDUCATION/TRAINING PROGRAM

## 2024-04-17 PROCEDURE — 99459 PELVIC EXAMINATION: CPT | Mod: S$PBB,,, | Performed by: STUDENT IN AN ORGANIZED HEALTH CARE EDUCATION/TRAINING PROGRAM

## 2024-04-17 PROCEDURE — 3074F SYST BP LT 130 MM HG: CPT | Mod: CPTII,,, | Performed by: STUDENT IN AN ORGANIZED HEALTH CARE EDUCATION/TRAINING PROGRAM

## 2024-04-17 PROCEDURE — 3008F BODY MASS INDEX DOCD: CPT | Mod: CPTII,,, | Performed by: STUDENT IN AN ORGANIZED HEALTH CARE EDUCATION/TRAINING PROGRAM

## 2024-04-17 PROCEDURE — 87086 URINE CULTURE/COLONY COUNT: CPT | Performed by: STUDENT IN AN ORGANIZED HEALTH CARE EDUCATION/TRAINING PROGRAM

## 2024-04-17 PROCEDURE — 99213 OFFICE O/P EST LOW 20 MIN: CPT | Mod: PBBFAC | Performed by: STUDENT IN AN ORGANIZED HEALTH CARE EDUCATION/TRAINING PROGRAM

## 2024-04-17 PROCEDURE — 87088 URINE BACTERIA CULTURE: CPT | Performed by: STUDENT IN AN ORGANIZED HEALTH CARE EDUCATION/TRAINING PROGRAM

## 2024-04-17 PROCEDURE — 99213 OFFICE O/P EST LOW 20 MIN: CPT | Mod: S$PBB,,, | Performed by: STUDENT IN AN ORGANIZED HEALTH CARE EDUCATION/TRAINING PROGRAM

## 2024-04-17 PROCEDURE — 81514 NFCT DS BV&VAGINITIS DNA ALG: CPT | Performed by: STUDENT IN AN ORGANIZED HEALTH CARE EDUCATION/TRAINING PROGRAM

## 2024-04-17 PROCEDURE — 99459 PELVIC EXAMINATION: CPT | Mod: PBBFAC | Performed by: STUDENT IN AN ORGANIZED HEALTH CARE EDUCATION/TRAINING PROGRAM

## 2024-04-17 PROCEDURE — 99999 PR PBB SHADOW E&M-EST. PATIENT-LVL III: CPT | Mod: PBBFAC,,, | Performed by: STUDENT IN AN ORGANIZED HEALTH CARE EDUCATION/TRAINING PROGRAM

## 2024-04-17 PROCEDURE — 87186 SC STD MICRODIL/AGAR DIL: CPT | Performed by: STUDENT IN AN ORGANIZED HEALTH CARE EDUCATION/TRAINING PROGRAM

## 2024-04-17 PROCEDURE — 3078F DIAST BP <80 MM HG: CPT | Mod: CPTII,,, | Performed by: STUDENT IN AN ORGANIZED HEALTH CARE EDUCATION/TRAINING PROGRAM

## 2024-04-17 PROCEDURE — 1160F RVW MEDS BY RX/DR IN RCRD: CPT | Mod: CPTII,,, | Performed by: STUDENT IN AN ORGANIZED HEALTH CARE EDUCATION/TRAINING PROGRAM

## 2024-04-17 PROCEDURE — 1159F MED LIST DOCD IN RCRD: CPT | Mod: CPTII,,, | Performed by: STUDENT IN AN ORGANIZED HEALTH CARE EDUCATION/TRAINING PROGRAM

## 2024-04-17 RX ORDER — SULFAMETHOXAZOLE AND TRIMETHOPRIM 800; 160 MG/1; MG/1
1 TABLET ORAL 2 TIMES DAILY
Qty: 6 TABLET | Refills: 0 | Status: SHIPPED | OUTPATIENT
Start: 2024-04-17 | End: 2024-04-20

## 2024-04-17 NOTE — PATIENT INSTRUCTIONS
There are steps you can take to prevent UTIs:   1. Keep your genital area clean and avoid sitting in wet or soiled undergarments for prolonged periods of time.   2. After urinating or having a bowel movement, cleanse thoroughly and always wipe front to back.   3. Drink plenty of fluids to keep well hydrated.   4. Dont hold off urinating when you need to go and dont rush to finish; give yourself time to completely empty your bladder.   5. Always urinate after having intercourse.     Adapted from: [email or web address]

## 2024-04-17 NOTE — PROGRESS NOTES
"Subjective     Patient ID: Kaye Gunn is a 18 y.o. female.    Chief Complaint:  Urinary Tract Infection (Patient presents to discuss UTI management. Dip today was unremarkable. She is unable to pinpoint what could be causing her UTIs. She does drink water and takes Azo otc to help, but sees no difference.) and Vaginitis (Burning and smell. Partner noticed during oral sex that discharge "tasted sour"/ slimey.)      History of Present Illness  17 yo presents for UTI discussion    Notes that she usually gets burning/dysuria and odor with urination. Symptoms are are always similar before UTIs, had several within the last year   Feels like sx are worse after sex, either with male or female partners.   She was receiving oral sex and her partner said things tasted sour, this was after she was working and she is unsure if it was due to being sweaty or not.     Does not douche or use feminine hygiene products.        GYN & OB History  No LMP recorded. Patient has had an implant.   Date of Last Pap: No result found    OB History    Para Term  AB Living   0 0 0 0 0 0   SAB IAB Ectopic Multiple Live Births   0 0 0 0 0       Review of Systems  Review of Systems   Genitourinary:  Positive for dysuria and vaginal discharge.   All other systems reviewed and are negative.         Objective   Physical Exam:   Constitutional: She appears well-developed and well-nourished.    HENT:   Head: Normocephalic and atraumatic.    Eyes: EOM are normal.      Pulmonary/Chest: Effort normal.        Abdominal: Soft.     Genitourinary:    Uterus, right adnexa and left adnexa normal.      Pelvic exam was performed with patient in the lithotomy position.   The external female genitalia was normal.   Genitalia hair distrobution normal .   There is no lesion on the right labia. There is no lesion on the left labia. Cervix is normal. There is vaginal discharge in the vagina.    Genitourinary Comments: Female chaperone present for " duration of exam             Musculoskeletal: Normal range of motion.       Neurological: She is alert.    Skin: Skin is warm and dry.    Psychiatric: She has a normal mood and affect.            Assessment and Plan     1. Dysuria    2. Acute vulvovaginitis        Plan:  1. Dysuria  - Will empirically treat for E.coli based on previous cultures  - Reviewed vulvovaginal hygiene and UTI prevention tips  - Trial of cranberry supplement     - Urine culture  - sulfamethoxazole-trimethoprim 800-160mg (BACTRIM DS) 800-160 mg Tab; Take 1 tablet by mouth 2 (two) times daily. for 3 days  Dispense: 6 tablet; Refill: 0    2. Acute vulvovaginitis  - Vaginosis Screen by DNA Probe      Follow up in about 3 months (around 7/17/2024).       Alfredo Frausto III, MD  [facility] - [facility] GYN   [address]  [place] [address] [phone]

## 2024-04-19 LAB
BACTERIA UR CULT: ABNORMAL
BACTERIAL VAGINOSIS DNA: NEGATIVE
CANDIDA GLABRATA DNA: NEGATIVE
CANDIDA KRUSEI DNA: NEGATIVE
CANDIDA RRNA VAG QL PROBE: NEGATIVE
T VAGINALIS RRNA GENITAL QL PROBE: NEGATIVE

## 2024-09-09 ENCOUNTER — OFFICE VISIT (OUTPATIENT)
Dept: OBSTETRICS AND GYNECOLOGY | Facility: CLINIC | Age: 19
End: 2024-09-09

## 2024-09-09 VITALS
DIASTOLIC BLOOD PRESSURE: 72 MMHG | BODY MASS INDEX: 33.58 KG/M2 | HEIGHT: 68 IN | SYSTOLIC BLOOD PRESSURE: 120 MMHG | WEIGHT: 221.56 LBS

## 2024-09-09 DIAGNOSIS — N89.8 VAGINAL IRRITATION: ICD-10-CM

## 2024-09-09 DIAGNOSIS — Z11.3 SCREEN FOR STD (SEXUALLY TRANSMITTED DISEASE): Primary | ICD-10-CM

## 2024-09-09 DIAGNOSIS — N91.2 AMENORRHEA: ICD-10-CM

## 2024-09-09 LAB
B-HCG UR QL: NEGATIVE
BILIRUB UR QL STRIP: NEGATIVE
CLARITY UR: CLEAR
COLOR UR: YELLOW
CTP QC/QA: YES
GLUCOSE UR QL STRIP: NEGATIVE
HGB UR QL STRIP: NEGATIVE
KETONES UR QL STRIP: NEGATIVE
LEUKOCYTE ESTERASE UR QL STRIP: NEGATIVE
NITRITE UR QL STRIP: NEGATIVE
PH UR STRIP: 7 [PH] (ref 5–8)
PROT UR QL STRIP: NEGATIVE
SP GR UR STRIP: 1.01 (ref 1–1.03)
URN SPEC COLLECT METH UR: NORMAL
UROBILINOGEN UR STRIP-ACNC: NEGATIVE EU/DL

## 2024-09-09 PROCEDURE — 81025 URINE PREGNANCY TEST: CPT | Mod: PBBFAC

## 2024-09-09 PROCEDURE — 99999PBSHW POCT URINE PREGNANCY: Mod: PBBFAC,,,

## 2024-09-09 PROCEDURE — 99213 OFFICE O/P EST LOW 20 MIN: CPT | Mod: S$PBB,,,

## 2024-09-09 PROCEDURE — 81514 NFCT DS BV&VAGINITIS DNA ALG: CPT

## 2024-09-09 PROCEDURE — 99999 PR PBB SHADOW E&M-EST. PATIENT-LVL II: CPT | Mod: PBBFAC,,,

## 2024-09-09 PROCEDURE — 87591 N.GONORRHOEAE DNA AMP PROB: CPT

## 2024-09-09 PROCEDURE — 99212 OFFICE O/P EST SF 10 MIN: CPT | Mod: PBBFAC

## 2024-09-09 PROCEDURE — 87491 CHLMYD TRACH DNA AMP PROBE: CPT

## 2024-09-09 PROCEDURE — 81003 URINALYSIS AUTO W/O SCOPE: CPT

## 2024-09-09 NOTE — PROGRESS NOTES
CC: Screening for sexually transmitted infection    Kaye Gunn is a 18 y.o. female  presents for STD screening  No LMP recorded. Patient has had an implant.. She reports some burning with urination and vaginal irritation. Denies any new rashes or lesions.  Denies pelvic or abdominal pain. Denies abnormal or foul vaginal discharge. No known exposure to STD. Has history of past sexually transmitted infection- Chlamydia , .     Last pap: n/a    ROS:  GENERAL: Denies weight gain or weight loss. Feeling well overall.   SKIN: Denies rash or lesions.   HEAD: Denies head injury or headache.   NODES: Denies enlarged lymph nodes.   CHEST: Denies chest pain or shortness of breath.   CARDIOVASCULAR: Denies palpitations or left sided chest pain.   ABDOMEN: No abdominal pain, constipation, diarrhea, nausea, vomiting or rectal bleeding.   URINARY: No frequency, dysuria, hematuria, or burning on urination.  REPRODUCTIVE: See HPI.   BREASTS: The patient performs breast self-examination and denies pain, lumps, or nipple discharge.   HEMATOLOGIC: No easy bruisability or excessive bleeding.   MUSCULOSKELETAL: Denies joint pain or swelling.   NEUROLOGIC: Denies syncope or weakness.   PSYCHIATRIC: Denies depression, anxiety or mood swings.    PHYSICAL EXAM:  APPEARANCE: Well nourished, well developed, in no acute distress.  AFFECT: Alert and oriented x 3  SKIN: Warm, dry, & intact. No acne or hirsutism.  NECK: Neck symmetric  NODES: No inguinal or femoral lymph node enlargement  CHEST:  Easy, even breaths.  ABDOMEN: Soft.  Nontender, nondistended.  PELVIC: Normal external genitalia without lesions.  Normal hair distribution.  Adequate perineal body, normal urethral meatus.    Vagina moist and well rugated without lesions or discharge.  Cervix pink, without lesions, or tenderness. + thin white discharge.  No significant cystocele or rectocele.    Bimanual exam shows uterus to be normal size, regular, mobile and  nontender.  Adnexa without masses or tenderness.    EXTREMITIES: No edema.    History reviewed. No pertinent past medical history.    History reviewed. No pertinent surgical history.    Family History   Problem Relation Name Age of Onset    Eclampsia Other      Miscarriages / Stillbirths Other       labor Other      Cancer Other      Breast cancer Neg Hx      Colon cancer Neg Hx      Ovarian cancer Neg Hx      Uterine cancer Neg Hx      Cervical cancer Neg Hx         Social History     Socioeconomic History    Marital status: Single   Tobacco Use    Smoking status: Never     Passive exposure: Never    Smokeless tobacco: Never   Substance and Sexual Activity    Alcohol use: Yes     Comment: Occasionally.    Drug use: Yes     Types: Marijuana     Comment: rarely    Sexual activity: Yes     Partners: Male     Birth control/protection: Implant     Social Determinants of Health     Financial Resource Strain: High Risk (2024)    Overall Financial Resource Strain (CARDIA)     Difficulty of Paying Living Expenses: Very hard   Food Insecurity: Patient Declined (2024)    Hunger Vital Sign     Worried About Running Out of Food in the Last Year: Patient declined     Ran Out of Food in the Last Year: Patient declined   Transportation Needs: Unknown (2024)    PRAPARE - Transportation     Lack of Transportation (Medical): Patient declined     Lack of Transportation (Non-Medical): No   Physical Activity: Inactive (2024)    Exercise Vital Sign     Days of Exercise per Week: 7 days     Minutes of Exercise per Session: 0 min   Stress: No Stress Concern Present (2024)    [employer] [place] - Occupational Stress Questionnaire     Feeling of Stress : Not at all   Housing Stability: Patient Declined (2024)    Housing Stability Vital Sign     Unable to Pay for Housing in the Last Year: Patient declined     Unstable Housing in the Last Year: Patient declined       No current outpatient  medications on file.     Current Facility-Administered Medications   Medication Dose Route Frequency Provider Last Rate Last Admin    etonogestreL subdermal device 68 mg  68 mg Implant  Noe Mejia NP   68 mg at 08/22/23 0900       Review of patient's allergies indicates:  No Known Allergies      Screen for STD (sexually transmitted disease)  -     C. trachomatis/N. gonorrhoeae by AMP DNA [facility]; Urine  -     Vaginosis Screen by DNA Probe  -     HIV 1/2 Ag/Ab (4th Gen); Future; Expected date: 09/09/2024  -     HEPATITIS PANEL, ACUTE; Future; Expected date: 09/09/2024  -     Treponema Pallidium Antibodies IgG, IgM; Future; Expected date: 09/09/2024    Amenorrhea  -     POCT URINE PREGNANCY    Vaginal irritation  -     Vaginosis Screen by DNA Probe  -     Urinalysis, Reflex to Urine Culture Urine, Clean Catch          Patient was counseled today on prevention of STDs with use of condoms, abstinence, gardasil vaccine.   We also reviewed A.C.S. Pap guidelines and recommendations for yearly pelvic exams, mammograms and monthly self breast exams; to see her PCP for other health maintenance.     Followup pending lab results

## 2024-09-10 LAB
BACTERIAL VAGINOSIS DNA: NEGATIVE
C TRACH DNA SPEC QL NAA+PROBE: NOT DETECTED
CANDIDA GLABRATA DNA: NEGATIVE
CANDIDA KRUSEI DNA: NEGATIVE
CANDIDA RRNA VAG QL PROBE: POSITIVE
N GONORRHOEA DNA SPEC QL NAA+PROBE: NOT DETECTED
T VAGINALIS RRNA GENITAL QL PROBE: NEGATIVE

## 2024-09-12 ENCOUNTER — PATIENT MESSAGE (OUTPATIENT)
Dept: OBSTETRICS AND GYNECOLOGY | Facility: CLINIC | Age: 19
End: 2024-09-12

## 2024-09-12 DIAGNOSIS — B37.9 YEAST INFECTION: Primary | ICD-10-CM

## 2024-09-12 RX ORDER — FLUCONAZOLE 150 MG/1
150 TABLET ORAL
Qty: 2 TABLET | Refills: 0 | Status: SHIPPED | OUTPATIENT
Start: 2024-09-12

## 2024-09-13 ENCOUNTER — PATIENT MESSAGE (OUTPATIENT)
Dept: OBSTETRICS AND GYNECOLOGY | Facility: CLINIC | Age: 19
End: 2024-09-13

## 2024-09-13 DIAGNOSIS — B37.9 YEAST INFECTION: ICD-10-CM

## 2024-09-16 RX ORDER — FLUCONAZOLE 150 MG/1
150 TABLET ORAL
Qty: 2 TABLET | Refills: 0 | Status: SHIPPED | OUTPATIENT
Start: 2024-09-16

## 2024-09-23 ENCOUNTER — OFFICE VISIT (OUTPATIENT)
Dept: OBSTETRICS AND GYNECOLOGY | Facility: CLINIC | Age: 19
End: 2024-09-23

## 2024-09-23 VITALS
HEART RATE: 72 BPM | HEIGHT: 68 IN | WEIGHT: 219.63 LBS | BODY MASS INDEX: 33.29 KG/M2 | DIASTOLIC BLOOD PRESSURE: 72 MMHG | SYSTOLIC BLOOD PRESSURE: 106 MMHG

## 2024-09-23 DIAGNOSIS — N76.0 ACUTE VULVOVAGINITIS: ICD-10-CM

## 2024-09-23 DIAGNOSIS — N39.0 RECURRENT UTI: ICD-10-CM

## 2024-09-23 DIAGNOSIS — R30.0 DYSURIA: Primary | ICD-10-CM

## 2024-09-23 LAB
B-HCG UR QL: NEGATIVE
BILIRUB UR QL STRIP: NEGATIVE
CLARITY UR REFRACT.AUTO: CLEAR
COLOR UR AUTO: YELLOW
CTP QC/QA: YES
GLUCOSE UR QL STRIP: NEGATIVE
HGB UR QL STRIP: NEGATIVE
KETONES UR QL STRIP: NEGATIVE
LEUKOCYTE ESTERASE UR QL STRIP: NEGATIVE
NITRITE UR QL STRIP: NEGATIVE
PH UR STRIP: 6 [PH] (ref 5–8)
PROT UR QL STRIP: NEGATIVE
SP GR UR STRIP: 1.02 (ref 1–1.03)
URN SPEC COLLECT METH UR: NORMAL

## 2024-09-23 PROCEDURE — 87086 URINE CULTURE/COLONY COUNT: CPT | Performed by: PHYSICIAN ASSISTANT

## 2024-09-23 PROCEDURE — 81025 URINE PREGNANCY TEST: CPT | Mod: PBBFAC | Performed by: PHYSICIAN ASSISTANT

## 2024-09-23 PROCEDURE — 99213 OFFICE O/P EST LOW 20 MIN: CPT | Mod: PBBFAC | Performed by: PHYSICIAN ASSISTANT

## 2024-09-23 PROCEDURE — 99999 PR PBB SHADOW E&M-EST. PATIENT-LVL III: CPT | Mod: PBBFAC,,, | Performed by: PHYSICIAN ASSISTANT

## 2024-09-23 PROCEDURE — 87491 CHLMYD TRACH DNA AMP PROBE: CPT | Performed by: PHYSICIAN ASSISTANT

## 2024-09-23 PROCEDURE — 81514 NFCT DS BV&VAGINITIS DNA ALG: CPT | Performed by: PHYSICIAN ASSISTANT

## 2024-09-23 PROCEDURE — 87591 N.GONORRHOEAE DNA AMP PROB: CPT | Performed by: PHYSICIAN ASSISTANT

## 2024-09-23 PROCEDURE — 81003 URINALYSIS AUTO W/O SCOPE: CPT | Performed by: PHYSICIAN ASSISTANT

## 2024-09-23 PROCEDURE — 99999PBSHW POCT URINE PREGNANCY: Mod: PBBFAC,,,

## 2024-09-23 PROCEDURE — 99213 OFFICE O/P EST LOW 20 MIN: CPT | Mod: S$PBB,,, | Performed by: PHYSICIAN ASSISTANT

## 2024-09-23 RX ORDER — NITROFURANTOIN 25; 75 MG/1; MG/1
100 CAPSULE ORAL 2 TIMES DAILY
Qty: 14 CAPSULE | Refills: 0 | Status: SHIPPED | OUTPATIENT
Start: 2024-09-23 | End: 2024-09-30

## 2024-09-23 NOTE — PROGRESS NOTES
Subjective:      Kaye Gunn is a 18 y.o. female who presents for dysuria. Reports burning with urination, urgency and frequency x 1 week. Seen 9/9/2024 for STD screen and GC/Chlamydia negative. Did test positive for yeast treated with diflcuan. UA was negative at that time. She does have a history of recurrent UTI. She has had one new partner since last seen. Admits to not using condoms. Agrees to STD screen. Declines STD labs.  She has nexplanon (inserted 8/22/2023) for contraception. Typically does not get a period with this but did have some bleeding earlier this month. Denies pelvic pain.      Office Visit on 09/23/2024   Component Date Value Ref Range Status    POC Preg Test, Ur 09/23/2024 Negative  Negative Final     Acceptable 09/23/2024 Yes   Final   Office Visit on 09/09/2024   Component Date Value Ref Range Status    Chlamydia, Amplified DNA 09/09/2024 Not Detected  Not Detected Final    N gonorrhoeae, amplified DNA 09/09/2024 Not Detected  Not Detected Final    Trichomonas vaginalis 09/09/2024 Negative  Negative Final    Candida sp 09/09/2024 Positive (A)  Negative Final    Heather glabrata DNA 09/09/2024 Negative  Negative Final    Heather krusei DNA 09/09/2024 Negative  Negative Final    Bacterial vaginosis DNA 09/09/2024 Negative  Negative Final    Specimen UA 09/09/2024 Urine, Clean Catch   Final    Color, UA 09/09/2024 Yellow  Yellow, Straw, [place] Final    Appearance, UA 09/09/2024 Clear  Clear Final    pH, UA 09/09/2024 7.0  5.0 - 8.0 Final    Specific Gravity, UA 09/09/2024 1.015  1.005 - 1.030 Final    Protein, UA 09/09/2024 Negative  Negative Final    Glucose, UA 09/09/2024 Negative  Negative Final    Ketones, UA 09/09/2024 Negative  Negative Final    Bilirubin (UA) 09/09/2024 Negative  Negative Final    Occult Blood UA 09/09/2024 Negative  Negative Final    Nitrite, UA 09/09/2024 Negative  Negative Final    Urobilinogen, UA 09/09/2024 Negative  <2.0 EU/dL Final    Leukocytes,  UA 2024 Negative  Negative Final    POC Preg Test, Ur 2024 Negative  Negative Final     Acceptable 2024 Yes   Final       No past medical history on file.  No past surgical history on file.  Social History     Tobacco Use    Smoking status: Never     Passive exposure: Never    Smokeless tobacco: Never   Substance Use Topics    Alcohol use: Yes     Comment: Occasionally.    Drug use: Yes     Types: Marijuana     Comment: rarely     Family History   Problem Relation Name Age of Onset    Eclampsia Other      Miscarriages / Stillbirths Other       labor Other      Cancer Other      Breast cancer Neg Hx      Colon cancer Neg Hx      Ovarian cancer Neg Hx      Uterine cancer Neg Hx      Cervical cancer Neg Hx       OB History    Para Term  AB Living   0 0 0 0 0 0   SAB IAB Ectopic Multiple Live Births   0 0 0 0 0       Current Outpatient Medications:     fluconazole (DIFLUCAN) 150 MG Tab, Take 1 tablet (150 mg total) by mouth every 72 hours as needed (yeast infection symptoms)., Disp: 2 tablet, Rfl: 0    nitrofurantoin, macrocrystal-monohydrate, (MACROBID) 100 MG capsule, Take 1 capsule (100 mg total) by mouth 2 (two) times daily. for 7 days, Disp: 14 capsule, Rfl: 0    Current Facility-Administered Medications:     etonogestreL subdermal device 68 mg, 68 mg, Implant, , Noe Mejia NP, 68 mg at 23 0900    Review of Systems:  General: No fever, chills, or weight loss.  Chest: No chest pain, shortness of breath, or palpitations.  Breast: No pain, masses, or nipple discharge.  Vulva: No pain, lesions, or itching.  Vagina: No relaxation, itching, discharge, or lesions.  Abdomen: No pain, nausea, vomiting, diarrhea, or constipation.  Urinary: No incontinence, nocturia. +dysuria +frequency  Extremities:  No leg cramps, edema, or calf pain.  Neurologic: No headaches, dizziness, or visual changes.    Objective:     Vitals:    24 1040   BP: 106/72   Pulse: 72  "  Weight: 99.6 kg (219 lb 9.6 oz)   Height: 5' 8" (1.727 m)   PainSc: 0-No pain     Body mass index is 33.39 kg/m².    PHYSICAL EXAM:  APPEARANCE: Well nourished, well developed, in no acute distress.  AFFECT: WNL, alert and oriented x 3  CHEST: Good respiratory effect  ABDOMEN: Soft.  No tenderness or masses.  No hepatosplenomegaly.  No hernias. No CVA tenderness bilaterally  PELVIC: Normal external genitalia without lesions.  Normal hair distribution.  Adequate perineal body, normal urethral meatus.  Vagina moist and well rugated without lesions or discharge.  Cervix pink, without lesions, discharge or tenderness.  No significant cystocele or rectocele.  Bimanual exam shows uterus to be normal size, regular, mobile and nontender.  Adnexa without masses or tenderness.    EXTREMITIES: No edema.    Assessment:      Dysuria  -     CULTURE, URINE  -     Urinalysis  -     nitrofurantoin, macrocrystal-monohydrate, (MACROBID) 100 MG capsule; Take 1 capsule (100 mg total) by mouth 2 (two) times daily. for 7 days  Dispense: 14 capsule; Refill: 0  -     POCT urine pregnancy    Acute vulvovaginitis  -     Vaginosis Screen by DNA Probe; Future; Expected date: 09/23/2024  -     C. trachomatis/N. gonorrhoeae by AMP DNA [facility]; Vagina    Recurrent UTI  -     Ambulatory referral/consult to Urology; Future; Expected date: 09/30/2024    POCT Urine pregnancy- negative    Plan:   Urine culture/UA  Start macrobid 100mg BID x 7 days.  Increase fluids  Counseled on preventative measures  Referral to Urology  Contact if symptoms are not resolving in 24-48 hours  Vaginosis and GC/Chlamydia screens  Encouraged regular condom use.  Discussed possible causes of episode of bleeding, including infection.  Will monitor for now but if occurs again, will continue work up and obtain pelvic US  Follow up if symptoms fail to improve or PRN    Instructed patient to call if she experiences any side effects or has any questions.    I spent a total of " 25 minutes on the day of the visit.This includes face to face time and non-face to face time preparing to see the patient (eg, review of tests), obtaining and/or reviewing separately obtained history, documenting clinical information in the electronic or other health record, independently interpreting results and communicating results to the patient/family/caregiver, or care coordinator.

## 2024-09-24 ENCOUNTER — ON-DEMAND VIRTUAL (OUTPATIENT)
Dept: URGENT CARE | Facility: CLINIC | Age: 19
End: 2024-09-24

## 2024-09-24 DIAGNOSIS — B00.1 COLD SORE: Primary | ICD-10-CM

## 2024-09-24 PROCEDURE — 99213 OFFICE O/P EST LOW 20 MIN: CPT | Mod: 95,,, | Performed by: NURSE PRACTITIONER

## 2024-09-24 RX ORDER — VALACYCLOVIR HYDROCHLORIDE 1 G/1
2000 TABLET, FILM COATED ORAL EVERY 12 HOURS
Qty: 4 TABLET | Refills: 0 | Status: SHIPPED | OUTPATIENT
Start: 2024-09-24 | End: 2024-09-25

## 2024-09-24 NOTE — PROGRESS NOTES
Subjective:      Patient ID: Kaye Gunn is a 18 y.o. female.    Vitals:  vitals were not taken for this visit.     Chief Complaint: Mouth Lesions      Visit Type: TELE AUDIOVISUAL - This visit was conducted virtually based on  subjective information and limited objective exam    Present with the patient at the time of consultation: TELEMED PRESENT WITH PATIENT: None        History reviewed. No pertinent past medical history.  History reviewed. No pertinent surgical history.  Review of patient's allergies indicates:  No Known Allergies  Current Outpatient Medications on File Prior to Visit   Medication Sig Dispense Refill    fluconazole (DIFLUCAN) 150 MG Tab Take 1 tablet (150 mg total) by mouth every 72 hours as needed (yeast infection symptoms). 2 tablet 0    nitrofurantoin, macrocrystal-monohydrate, (MACROBID) 100 MG capsule Take 1 capsule (100 mg total) by mouth 2 (two) times daily. for 7 days 14 capsule 0     Current Facility-Administered Medications on File Prior to Visit   Medication Dose Route Frequency Provider Last Rate Last Admin    etonogestreL subdermal device 68 mg  68 mg Implant  Noe Mejia NP   68 mg at 23 0900     Family History   Problem Relation Name Age of Onset    Eclampsia Other      Miscarriages / Stillbirths Other       labor Other      Cancer Other      Breast cancer Neg Hx      Colon cancer Neg Hx      Ovarian cancer Neg Hx      Uterine cancer Neg Hx      Cervical cancer Neg Hx         Medications Ordered                [id number] - [place], [place] - [address], [place] [address]    Telephone: [phone]   Fax: [phone]   Hours: Not open 24 hours                         E-Prescribed (1 of 1)              valACYclovir (VALTREX) 1000 MG tablet    Sig: Take 2 tablets (2,000 mg total) by mouth every 12 (twelve) hours. for 1 day       Start: 24     Quantity: 4 tablet  Refills: 0                           Ohs Peq Odvv Intake    9/24/2024  6:46 PM CDT - Filed by Patient   What is your current physical address in the event of a medical emergency? Home   Are you able to take your vital signs? No   Please attach any relevant images or files          19 yo female with c/o bumps to corner of mouth. She states pain on pressure or touch. She denies drainage. She states noticed them several days ago. She denies past hx of herpes.         Constitution: Negative.   HENT: Negative.     Cardiovascular: Negative.    Respiratory: Negative.     Gastrointestinal: Negative.    Endocrine: negative.   Genitourinary: Negative.  Negative for frequency and urgency.   Musculoskeletal: Negative.    Skin:  Positive for rash.   Allergic/Immunologic: Negative.    Neurological: Negative.    Hematologic/Lymphatic: Negative.    Psychiatric/Behavioral: Negative.          Objective:   The physical exam was conducted virtually.  LOCATION OF PATIENT [facility] x 3 ; no acute distress noted; appearance normal; mood and behavior normal; thought process normal  Head- normocephalic  Nose- appears normal, no discharge or erythema  Eyes- pupils appear normal in size, no drainage, no erythema  Ears- normal appearing; no discharge, no erythema  Mouth- appears normal  Oropharynx- no erythema, lesions  Lungs- breathing at a normal rate, no acute distress noted  Heart- no reports of tachycardia, palpitations, chest pain  Abdomen- non distended, non tender reported by patient  Skin- warm and dry, no erythema or edema noted by patient or visualized  Psych- as above; no si/hi      Assessment:     1. Cold sore        Plan:     I suspect oral herpes, advised f/u    Thank you for choosing [facility]!    Our goal in the [facility] is to always provide outstanding medical care. You may receive a survey by mail or e-mail in the next week regarding your experience today. We would greatly  appreciate you completing and returning the survey. Your feedback provides us with a way to recognize our staff who provide very good care, and it helps us learn how to improve when your experience was below our aspiration of excellence.         We appreciate you trusting us with your medical care. We hope you feel better soon. We will be happy to take care of you for all of your future medical needs.    You must understand that you've received an Urgent Care treatment only and that you may be released before all your medical problems are known or treated. You, the patient, will arrange for follow up care as instructed.    Follow up with your PCP or specialty clinic as directed in the next 1-2 weeks if not improved or as needed.  You can call [phone] to schedule an appointment with the appropriate provider.    If your condition worsens we recommend that you receive another evaluation in person, with your primary care provider, urgent care or at the emergency room immediately or contact your primary medical clinics after hours call service to discuss your concerns.         Cold sore  -     valACYclovir (VALTREX) 1000 MG tablet; Take 2 tablets (2,000 mg total) by mouth every 12 (twelve) hours. for 1 day  Dispense: 4 tablet; Refill: 0

## 2024-09-25 DIAGNOSIS — N76.0 ACUTE VULVOVAGINITIS: Primary | ICD-10-CM

## 2024-09-25 RX ORDER — TERCONAZOLE 4 MG/G
1 CREAM VAGINAL NIGHTLY
Qty: 1 EACH | Refills: 0 | Status: SHIPPED | OUTPATIENT
Start: 2024-09-25 | End: 2024-10-02

## 2024-12-10 ENCOUNTER — PATIENT MESSAGE (OUTPATIENT)
Dept: ADMINISTRATIVE | Facility: HOSPITAL | Age: 19
End: 2024-12-10

## 2025-01-10 ENCOUNTER — OFFICE VISIT (OUTPATIENT)
Dept: PRIMARY CARE CLINIC | Facility: CLINIC | Age: 20
End: 2025-01-10

## 2025-01-10 DIAGNOSIS — B34.9 VIRAL ILLNESS: Primary | ICD-10-CM

## 2025-01-10 NOTE — PROGRESS NOTES
The patient location is: [place]  The chief complaint leading to consultation is: fver and headache    Visit type: audiovisual    Face to Face time with patient: 16   minutes of total time spent on the encounter, which includes face to face time and non-face to face time preparing to see the patient (eg, review of tests), Obtaining and/or reviewing separately obtained history, Documenting clinical information in the electronic or other health record, Independently interpreting results (not separately reported) and communicating results to the patient/family/caregiver, or Care coordination (not separately reported).         Each patient to whom he or she provides medical services by telemedicine is:  (1) informed of the relationship between the physician and patient and the respective role of any other health care provider with respect to management of the patient; and (2) notified that he or she may decline to receive medical services by telemedicine and may withdraw from such care at any time.    Notes:        Subjective:       Patient ID: Kaye Gunn is a 19 y.o. female.    Chief Complaint: No chief complaint on file.    History of Present Illness    CHIEF COMPLAINT:  Patient presents today with a headache and fever.    FLU-LIKE SYMPTOMS:  She reports headache onset Monday/Tuesday, described as circumferential with band-like distribution around the head. Initially attributed symptoms to hair locks, but pain persisted after removal. She took Advil without relief. She reports fever since Monday with alternating sensations of feeling very hot and cold, though temperature was not measured. She endorses shoulder soreness and achiness, possibly positional.    SOCIAL HISTORY:  She reports possible exposure to sick individuals at work.    MEDICATIONS:  She has taken Advil for headache and Mucinex for fever.      ROS:  General: +fever, +chills  Musculoskeletal: +muscle pain  Neurological: +headache          Review of  patient's allergies indicates:  No Known Allergies      Current Outpatient Medications:     fluconazole (DIFLUCAN) 150 MG Tab, Take 1 tablet (150 mg total) by mouth every 72 hours as needed (yeast infection symptoms)., Disp: 2 tablet, Rfl: 0    valACYclovir (VALTREX) 1000 MG tablet, Take 2 tablets (2,000 mg total) by mouth every 12 (twelve) hours. for 1 day, Disp: 4 tablet, Rfl: 0    Current Facility-Administered Medications:     etonogestreL subdermal device 68 mg, 68 mg, Implant, , Noe Mejia, NP, 68 mg at 08/22/23 0900    No past medical history on file. No past surgical history on file.   Social History     Socioeconomic History    Marital status: Single   Tobacco Use    Smoking status: Never     Passive exposure: Never    Smokeless tobacco: Never   Substance and Sexual Activity    Alcohol use: Yes     Comment: Occasionally.    Drug use: Yes     Types: Marijuana     Comment: rarely    Sexual activity: Yes     Partners: Male     Birth control/protection: Implant     Social Drivers of Health     Financial Resource Strain: High Risk (2/8/2024)    Overall Financial Resource Strain (CARDIA)     Difficulty of Paying Living Expenses: Very hard   Food Insecurity: Patient Declined (2/8/2024)    Hunger Vital Sign     Worried About Running Out of Food in the Last Year: Patient declined     Ran Out of Food in the Last Year: Patient declined   Transportation Needs: Unknown (2/8/2024)    PRAPARE - Transportation     Lack of Transportation (Medical): Patient declined     Lack of Transportation (Non-Medical): No   Physical Activity: Inactive (2/8/2024)    Exercise Vital Sign     Days of Exercise per Week: 7 days     Minutes of Exercise per Session: 0 min   Stress: No Stress Concern Present (2/8/2024)    [employer] [place] [employer] - Occupational Stress Questionnaire     Feeling of Stress : Not at all   Housing Stability: Patient Declined (2/8/2024)    Housing Stability Vital Sign     Unable to Pay for Housing in  the Last Year: Patient declined     Unstable Housing in the Last Year: Patient declined      Family History   Problem Relation Name Age of Onset    Eclampsia Other      Miscarriages / Stillbirths Other       labor Other      Cancer Other      Breast cancer Neg Hx      Colon cancer Neg Hx      Ovarian cancer Neg Hx      Uterine cancer Neg Hx      Cervical cancer Neg Hx       Review of Systems   Constitutional:  Positive for fever.   HENT:  Negative for congestion, ear pain and sore throat.    Respiratory:  Negative for cough and wheezing.    Cardiovascular:  Negative for chest pain.   Gastrointestinal:  Positive for diarrhea. Negative for abdominal pain, nausea and vomiting.   Genitourinary:  Negative for dysuria.   Skin:  Negative for rash.   Neurological:  Positive for headaches.       Objective:      Physical Exam    General: Alert, no acute distress   HEENT:  NC/AT, no facial swelling of deformity  Neck:  supple  Resp:  Normal effort, no respiratory distress   Psych:  Normal affect           Assessment:       1. Viral illness        Plan:         Diagnoses and all orders for this visit:    Viral illness        Assessment & Plan    SUSPECTED VIRAL ILLNESS (POSSIBLY INFLUENZA OR COVID-19):  - Assessed the patient's symptoms and suspected a viral illness, possibly influenza or COVID-19.  - Explained the differences between various viral illnesses, including influenza and COVID-19.  - Discussed the expected duration of viral symptoms, particularly fever.  - Reviewed signs that may indicate a bacterial infection requiring further evaluation.  - Advised the patient to consider COVID-19 testing if desired.  - Instructed the patient to avoid contact with others, wear a mask, and practice proper hand hygiene.  - Patient should consider wearing a mask to protect others, especially while experiencing fever.  - Patient to wash hands frequently.  - Recommend avoiding contact with others while symptomatic, particularly  while febrile.    FEVER MANAGEMENT:  - Instructed the patient to obtain a thermometer to monitor temperature.  - Recommend drinking plenty of fluids and taking acetaminophen and ibuprofen for fever and aches.  - Prescribed acetaminophen (Tylenol) and ibuprofen (Advil) on a schedule for pyrexia and pain relief.  - Advised that medications can be alternated every 3-4 hours.  - Instructed to take medications as needed for symptom management.    HEADACHE:  - Evaluated the patient's severe headache, which started on Monday or Tuesday and affected the whole head.  - Noted that the headache has improved but was initially severe enough to cause pain with head movement.  - Confirmed that the headache is not the worst of the patient's life and there's no associated nausea or emesis.  - Recommend acetaminophen or ibuprofen for headache management.    MYALGIA:  - Noted that the patient reports myalgia, particularly in the shoulders and upper body.    GENERAL ILLNESS MANAGEMENT:  - Educated on importance of hydration and rest during illness.  - Recommend drinking plenty of fluids.  - Consider vitamin C supplementation.    FOLLOW UP:  - Provided a work excuse note for Wednesday, Thursday, and Friday.  - Advised to contact the office if symptoms worsen, including development of severe pharyngitis, otalgia, productive cough with purulent sputum, or dysuria.  - Recommend follow-up if symptoms, particularly fever, do not improve by Monday.          Trena Dash MD          This note was generated with the assistance of ambient listening technology. Verbal consent was obtained by the patient and accompanying visitor(s) for the recording of patient appointment to facilitate this note. I attest to having reviewed and edited the generated note for accuracy, though some syntax or spelling errors may persist. Please contact the author of this note for any clarification.       Answers submitted by the patient for this visit:  Fever  Questionnaire (Submitted on 1/10/2025)  Chief Complaint: Fever  Chronicity: recurrent  Onset: in the past 7 days  Frequency: 2 to 4 times per day  Progression since onset: waxing and waning  Max temp prior to arrival: unmeasured  muscle aches: Yes  sleepiness: No  Treatments tried: NSAIDs, acetaminophen, fluids  Improvement on treatment: no relief

## 2025-01-13 ENCOUNTER — OFFICE VISIT (OUTPATIENT)
Dept: INTERNAL MEDICINE | Facility: CLINIC | Age: 20
End: 2025-01-13

## 2025-01-13 VITALS
WEIGHT: 213.38 LBS | DIASTOLIC BLOOD PRESSURE: 74 MMHG | BODY MASS INDEX: 32.34 KG/M2 | SYSTOLIC BLOOD PRESSURE: 106 MMHG | HEART RATE: 72 BPM | HEIGHT: 68 IN | OXYGEN SATURATION: 99 %

## 2025-01-13 DIAGNOSIS — J06.9 UPPER RESPIRATORY TRACT INFECTION, UNSPECIFIED TYPE: Primary | ICD-10-CM

## 2025-01-13 LAB
CTP QC/QA: YES
FLUAV AG NPH QL: NEGATIVE
FLUBV AG NPH QL: NEGATIVE
S PYO RRNA THROAT QL PROBE: NEGATIVE
SARS-COV-2 RDRP RESP QL NAA+PROBE: NEGATIVE

## 2025-01-13 PROCEDURE — 99999 PR PBB SHADOW E&M-EST. PATIENT-LVL III: CPT | Mod: PBBFAC,,,

## 2025-01-13 PROCEDURE — 99999PBSHW: Mod: PBBFAC,,,

## 2025-01-13 PROCEDURE — 99213 OFFICE O/P EST LOW 20 MIN: CPT | Mod: S$PBB,,,

## 2025-01-13 PROCEDURE — 99999PBSHW POCT RAPID STREP A: Mod: PBBFAC,,,

## 2025-01-13 PROCEDURE — 99999PBSHW POCT INFLUENZA A/B: Mod: 59,PBBFAC,,

## 2025-01-13 PROCEDURE — 87502 INFLUENZA DNA AMP PROBE: CPT | Mod: PBBFAC

## 2025-01-13 PROCEDURE — 99213 OFFICE O/P EST LOW 20 MIN: CPT | Mod: PBBFAC

## 2025-01-13 PROCEDURE — 87880 STREP A ASSAY W/OPTIC: CPT | Mod: PBBFAC

## 2025-01-13 PROCEDURE — 87635 SARS-COV-2 COVID-19 AMP PRB: CPT | Mod: PBBFAC

## 2025-01-13 RX ORDER — AZITHROMYCIN 250 MG/1
TABLET, FILM COATED ORAL
Qty: 6 TABLET | Refills: 0 | Status: SHIPPED | OUTPATIENT
Start: 2025-01-13 | End: 2025-01-18

## 2025-01-13 NOTE — PROGRESS NOTES
CHIEF COMPLAINT     Chief Complaint   Patient presents with    Fever    Generalized Body Aches    Headache    Sore Throat    Cough       HPI     Kaye Gunn is a 19 y.o. female who presents for urgent visit of fever, body aches, headache, and cough today.    PCP is Brigitte Aaron MD, patient is new to me. Pt consents to AI recording of visit for documentation purposes.     History of Present Illness    CHIEF COMPLAINT:  Patient presents today for viral symptoms.    HISTORY OF PRESENT ILLNESS:  She has had symptoms for approximately one week, including mild nocturnal cough, sore throat, and headaches. She states that she was seen by telehealth provider last week who stated that she should be re-evaluated if she still has fevers, chills, and body aches.            Home Medications:  Prior to Admission medications    Medication Sig Start Date End Date Taking? Authorizing Provider   azithromycin (Z-ANILA) 250 MG tablet Take 2 tablets by mouth on day 1; Take 1 tablet by mouth on days 2-5 1/13/25 1/18/25  Andrés Albright MD   fluconazole (DIFLUCAN) 150 MG Tab Take 1 tablet (150 mg total) by mouth every 72 hours as needed (yeast infection symptoms).  Patient not taking: Reported on 1/13/2025 9/16/24   Jazlyn Harrison NP   valACYclovir (VALTREX) 1000 MG tablet Take 2 tablets (2,000 mg total) by mouth every 12 (twelve) hours. for 1 day 9/24/24 9/25/24  Gissel Orellana, [facility]         Health Maintainence:   Immunizations:  Health Maintenance         Date Due Completion Date    Lipid Panel Never done ---    Pneumococcal Vaccines (Age 0-49) (1 of 2 - PCV) 10/02/2022 8/30/2004    Influenza Vaccine (1) Never done ---    COVID-19 Vaccine (3 - 2024-25 season) 09/01/2024 1/13/2022    Pap Smear Never done ---    Chlamydia Screening 09/23/2025 9/23/2024    TETANUS VACCINE 05/06/2031 5/6/2021    RSV Vaccine (Age 60+ and Pregnant patients) (1 - 1-dose 75+ series) 10/02/2078 ---             PHYSICAL EXAM     /74 (BP Location:  "Left arm, Patient Position: Sitting)   Pulse 72   Ht 5' 8" (1.727 m)   Wt 96.8 kg (213 lb 6.5 oz)   SpO2 99%   BMI 32.45 kg/m²     Physical Exam  Constitutional:       General: She is not in acute distress.     Appearance: Normal appearance. She is not toxic-appearing.   HENT:      Head: Normocephalic and atraumatic.      Right Ear: External ear normal.      Left Ear: External ear normal.      Nose: Congestion and rhinorrhea present.      Mouth/Throat:      Mouth: Mucous membranes are moist.   Eyes:      Extraocular Movements: Extraocular movements intact.   Cardiovascular:      Rate and Rhythm: Normal rate and regular rhythm.      Pulses: Normal pulses.      Heart sounds: Normal heart sounds.   Pulmonary:      Effort: Pulmonary effort is normal. No respiratory distress.   Abdominal:      General: Abdomen is flat.      Palpations: Abdomen is soft.      Tenderness: There is no abdominal tenderness.   Musculoskeletal:      Cervical back: Normal range of motion and neck supple.   Skin:     General: Skin is warm.      Findings: No bruising or erythema.   Neurological:      General: No focal deficit present.      Mental Status: She is alert.   Psychiatric:         Mood and Affect: Mood normal.       LABS     No results found for: "LABA1C", "HGBA1C"  CMP  No results found for: "NA", "K", "CL", "CO2", "GLU", "BUN", "CREATININE", "CALCIUM", "PROT", "ALBUMIN", "BILITOT", "ALKPHOS", "AST", "ALT", "ANIONGAP", "ESTGFRAFRICA", "EGFRNONAA"  No results found for: "WBC", "HGB", "HCT", "MCV", "PLT"  No results found for: "CHOL"  No results found for: "HDL"  No results found for: "LDLCALC"  No results found for: "TRIG"  No results found for: "CHOLHDL"  No results found for: "TSH", "P6ELXUM", "N1KFHWO", "THYROIDAB"    ASSESSMENT/PLAN   Assessment & Plan    Considered 3 main differential diagnoses: flu, COVID, or strep, given patient's viral symptoms persisting for about a week, negative for all three  Reviewed previous virtual " "visit note  Implemented "wait and watch" approach, prescribing antibiotics to have on hand but advising patient to wait before taking    PLAN SUMMARY:  Ordered COVID-19 test  Ordered influenza test  Ordered strep swab test  Prescribed antibiotics for patient to have on hand  Patient to starting antibiotics if symptoms persist or worsen by day 10-14    VIRAL INFECTION:  Explained to the patient that they likely have a viral infection that will resolve on its own.  Instructed the patient to monitor symptoms for improvement over the next few days.  Advised the patient to consider starting prescribed antibiotics if symptoms persist or worsen by day 10-11.  Prescribed antibiotics for the patient to have on hand, to be started around day 10-11 if symptoms persist or worsen.  Patient has been experiencing viral symptoms for about a week, including a slight cough and sore throat.  Considered headaches as part of viral symptoms.  Considered fever as part of viral symptoms.      FOLLOW UP:  Instructed the patient to contact the office if symptoms persist or worsen after antibiotics         Kaye was seen today for fever, generalized body aches, headache, sore throat and cough.    Diagnoses and all orders for this visit:    Upper respiratory tract infection, unspecified type  -     POCT COVID-19 Rapid Screening  -     POCT Influenza A/B Rapid Antigen  -     POCT Rapid Strep A  -     azithromycin (Z-ANILA) 250 MG tablet; Take 2 tablets by mouth on day 1; Take 1 tablet by mouth on days 2-5          Andrés Albright MD   Department of Internal Medicine - [facility]  3:30 PM      "

## 2025-01-14 ENCOUNTER — PATIENT MESSAGE (OUTPATIENT)
Dept: INTERNAL MEDICINE | Facility: CLINIC | Age: 20
End: 2025-01-14

## 2025-01-14 NOTE — LETTER
January 14, 2025      [facility]  [address]  [facility] [address]  Phone: [phone]  Fax: [phone]       Patient: Kaye Gunn   YOB: 2005  Date of Visit: 01/13/2025    To Whom It May Concern:    Kaye Gunn  was at [facility] on 01/13/2025. The patient may return to work/school on 01/18/2025 with no restrictions. If you have any questions or concerns, or if I can be of further assistance, please do not hesitate to contact me.    Sincerely,    Andrés Albright MD

## 2025-01-21 ENCOUNTER — TELEPHONE (OUTPATIENT)
Dept: OBSTETRICS AND GYNECOLOGY | Facility: CLINIC | Age: 20
End: 2025-01-21

## 2025-02-25 ENCOUNTER — OFFICE VISIT (OUTPATIENT)
Dept: OBSTETRICS AND GYNECOLOGY | Facility: CLINIC | Age: 20
End: 2025-02-25

## 2025-02-25 VITALS
HEART RATE: 76 BPM | WEIGHT: 216.19 LBS | DIASTOLIC BLOOD PRESSURE: 88 MMHG | SYSTOLIC BLOOD PRESSURE: 129 MMHG | BODY MASS INDEX: 32.87 KG/M2

## 2025-02-25 DIAGNOSIS — N89.8 VAGINAL DISCHARGE: ICD-10-CM

## 2025-02-25 DIAGNOSIS — Z20.2 POSSIBLE EXPOSURE TO STD: ICD-10-CM

## 2025-02-25 DIAGNOSIS — Z12.4 SCREENING FOR CERVICAL CANCER: ICD-10-CM

## 2025-02-25 DIAGNOSIS — Z01.419 WELL WOMAN EXAM WITH ROUTINE GYNECOLOGICAL EXAM: Primary | ICD-10-CM

## 2025-02-25 DIAGNOSIS — N89.8 VAGINAL ODOR: ICD-10-CM

## 2025-02-25 PROCEDURE — 99213 OFFICE O/P EST LOW 20 MIN: CPT | Mod: PBBFAC | Performed by: OBSTETRICS & GYNECOLOGY

## 2025-02-25 PROCEDURE — 99395 PREV VISIT EST AGE 18-39: CPT | Mod: S$PBB,,, | Performed by: OBSTETRICS & GYNECOLOGY

## 2025-02-25 PROCEDURE — 81515 NFCT DS BV&VAGINITIS DNA ALG: CPT | Performed by: OBSTETRICS & GYNECOLOGY

## 2025-02-25 PROCEDURE — 88175 CYTOPATH C/V AUTO FLUID REDO: CPT | Performed by: OBSTETRICS & GYNECOLOGY

## 2025-02-25 PROCEDURE — 99999 PR PBB SHADOW E&M-EST. PATIENT-LVL III: CPT | Mod: PBBFAC,,, | Performed by: OBSTETRICS & GYNECOLOGY

## 2025-02-25 PROCEDURE — 87491 CHLMYD TRACH DNA AMP PROBE: CPT | Performed by: OBSTETRICS & GYNECOLOGY

## 2025-02-25 NOTE — PROGRESS NOTES
CC: Well woman exam    Kaye Gunn is a 19 y.o. female  presents for well woman exam.  LMP: Patient's last menstrual period was 2025 (exact date)..  Patient states that she may have a vaginal discharge.  Reports an odor for 2 days.  Denies itching.  Patient think she may have a STD exposure and would like to be tested.  Patient also reports anal itching.  She has occ constipation depending on diet.    OB History          0    Para   0    Term   0       0    AB   0    Living   0         SAB   0    IAB   0    Ectopic   0    Multiple   0    Live Births   0               Gynecology   History reviewed. No pertinent past medical history.  History reviewed. No pertinent surgical history.  Social History[1]  Family History   Problem Relation Name Age of Onset    Eclampsia Other      Miscarriages / Stillbirths Other       labor Other      Cancer Other      Breast cancer Neg Hx      Colon cancer Neg Hx      Ovarian cancer Neg Hx      Uterine cancer Neg Hx      Cervical cancer Neg Hx           /88 (Patient Position: Sitting)   Pulse 76   Wt 98.1 kg (216 lb 3.2 oz)   LMP 2025 (Exact Date)   BMI 32.87 kg/m²       ROS  Review of Systems   Constitutional: Negative.    Gastrointestinal:  Positive for constipation.        Anal itching   Genitourinary: Negative.         Vaginal discharge, vaginal odor   Psychiatric/Behavioral: Negative.            PHYSICAL EXAM:  Physical Exam  Vitals reviewed.   Constitutional:       Appearance: Normal appearance. She is well-developed.   Chest:   Breasts:     Breasts are symmetrical.      Right: No inverted nipple, mass, nipple discharge, skin change or tenderness.      Left: No inverted nipple, mass, nipple discharge, skin change or tenderness.   Abdominal:      General: Abdomen is flat.      Palpations: Abdomen is soft.      Tenderness: There is no abdominal tenderness. There is no guarding or rebound.   Genitourinary:     General: Normal  vulva.      Exam position: Lithotomy position.      Labia:         Right: No rash, tenderness or lesion.         Left: No rash, tenderness or lesion.       Vagina: Normal.      Cervix: Discharge (thin yellow/greenish dischage) present. No cervical motion tenderness or lesion.      Uterus: Normal. Not tender.       Adnexa: Right adnexa normal and left adnexa normal.        Right: No mass, tenderness or fullness.          Left: No mass, tenderness or fullness.        Rectum: External hemorrhoid present.   Lymphadenopathy:      Upper Body:      Right upper body: No axillary adenopathy.      Left upper body: No axillary adenopathy.   Neurological:      Mental Status: She is alert.   Psychiatric:         Behavior: Behavior is cooperative.            Well woman exam with routine gynecological exam    Vaginal discharge    Possible exposure to STD    Screening for cervical cancer        Pap done  Vaginal odor: Vaginosis swab sent  STD exposure: GC/CT, HIV, TPA, HBsAb ordered, recommended condom use, safe sex practices  SBE education completed  External hemorrhoids on exam: recommend hemorrhoid cream with steroid for relief  RTO 1 year or prn    Patient was counseled today on A.C.S. Pap guidelines and recommendations for yearly pelvic exams, mammograms and monthly self breast exams; to see her PCP for other health maintenance.     No follow-ups on file.         [1]   Social History  Socioeconomic History    Marital status: Single   Tobacco Use    Smoking status: Never     Passive exposure: Never    Smokeless tobacco: Never   Substance and Sexual Activity    Alcohol use: Yes     Comment: Occasionally.    Drug use: Yes     Types: Marijuana     Comment: rarely    Sexual activity: Yes     Partners: Male     Birth control/protection: Implant     Social Drivers of Health     Financial Resource Strain: High Risk (2/8/2024)    Overall Financial Resource Strain (CARDIA)     Difficulty of Paying Living Expenses: Very hard   Food  Insecurity: Patient Declined (2/8/2024)    Hunger Vital Sign     Worried About Running Out of Food in the Last Year: Patient declined     Ran Out of Food in the Last Year: Patient declined   Transportation Needs: Unknown (2/8/2024)    PRAPARE - Transportation     Lack of Transportation (Medical): Patient declined     Lack of Transportation (Non-Medical): No   Physical Activity: Inactive (2/8/2024)    Exercise Vital Sign     Days of Exercise per Week: 7 days     Minutes of Exercise per Session: 0 min   Stress: No Stress Concern Present (2/8/2024)    [employer] [place] [employer] - Occupational Stress Questionnaire     Feeling of Stress : Not at all   Housing Stability: Patient Declined (2/8/2024)    Housing Stability Vital Sign     Unable to Pay for Housing in the Last Year: Patient declined     Unstable Housing in the Last Year: Patient declined

## 2025-02-28 ENCOUNTER — RESULTS FOLLOW-UP (OUTPATIENT)
Dept: OBSTETRICS AND GYNECOLOGY | Facility: CLINIC | Age: 20
End: 2025-02-28

## 2025-02-28 DIAGNOSIS — N76.0 BACTERIAL VAGINOSIS: ICD-10-CM

## 2025-02-28 DIAGNOSIS — B96.89 BACTERIAL VAGINOSIS: ICD-10-CM

## 2025-02-28 DIAGNOSIS — B37.31 VAGINAL YEAST INFECTION: Primary | ICD-10-CM

## 2025-02-28 LAB
BACTERIAL VAGINOSIS DNA: DETECTED
CANDIDA GLABRATA/KRUSEI: NOT DETECTED
CANDIDA RRNA VAG QL PROBE: DETECTED
CLINICAL INFO: NORMAL
CYTO CVX: NORMAL
CYTOLOGIST CVX/VAG CYTO: NORMAL
CYTOLOGIST CVX/VAG CYTO: NORMAL
CYTOLOGY CMNT CVX/VAG CYTO-IMP: NORMAL
CYTOLOGY PAP THIN PREP EXPLANATION: NORMAL
DATE OF PREVIOUS PAP: NO
DATE PREVIOUS BX: NO
GEN CATEG CVX/VAG CYTO-IMP: NORMAL
LMP START DATE: NORMAL
MICROORGANISM CVX/VAG CYTO: NORMAL
PATHOLOGIST CVX/VAG CYTO: NORMAL
SERVICE CMNT-IMP: NORMAL
SPECIMEN SOURCE CVX/VAG CYTO: NORMAL
STAT OF ADQ CVX/VAG CYTO-IMP: NORMAL
TRICHOMONAS VAGINALIS: NOT DETECTED

## 2025-02-28 RX ORDER — METRONIDAZOLE 7.5 MG/G
1 GEL VAGINAL DAILY
Qty: 70 G | Refills: 1 | Status: SHIPPED | OUTPATIENT
Start: 2025-02-28 | End: 2025-03-05

## 2025-02-28 RX ORDER — FLUCONAZOLE 150 MG/1
150 TABLET ORAL ONCE
Qty: 1 TABLET | Refills: 1 | Status: SHIPPED | OUTPATIENT
Start: 2025-02-28 | End: 2025-02-28

## 2025-03-01 LAB
C TRACH DNA SPEC QL NAA+PROBE: NOT DETECTED
N GONORRHOEA DNA SPEC QL NAA+PROBE: NOT DETECTED

## 2025-04-01 ENCOUNTER — PATIENT OUTREACH (OUTPATIENT)
Dept: ADMINISTRATIVE | Facility: HOSPITAL | Age: 20
End: 2025-04-01

## 2025-05-15 ENCOUNTER — OFFICE VISIT (OUTPATIENT)
Dept: OBSTETRICS AND GYNECOLOGY | Facility: CLINIC | Age: 20
End: 2025-05-15

## 2025-05-15 VITALS
SYSTOLIC BLOOD PRESSURE: 120 MMHG | BODY MASS INDEX: 33.27 KG/M2 | DIASTOLIC BLOOD PRESSURE: 78 MMHG | WEIGHT: 218.81 LBS | HEART RATE: 80 BPM

## 2025-05-15 DIAGNOSIS — N89.8 VAGINAL ODOR: ICD-10-CM

## 2025-05-15 DIAGNOSIS — Z11.3 SCREEN FOR STD (SEXUALLY TRANSMITTED DISEASE): ICD-10-CM

## 2025-05-15 DIAGNOSIS — K64.4 EXTERNAL HEMORRHOID: ICD-10-CM

## 2025-05-15 DIAGNOSIS — N89.8 VAGINAL DISCHARGE: Primary | ICD-10-CM

## 2025-05-15 DIAGNOSIS — K59.00 CONSTIPATION, UNSPECIFIED CONSTIPATION TYPE: ICD-10-CM

## 2025-05-15 PROCEDURE — G2211 COMPLEX E/M VISIT ADD ON: HCPCS | Mod: S$PBB,,, | Performed by: OBSTETRICS & GYNECOLOGY

## 2025-05-15 PROCEDURE — 99214 OFFICE O/P EST MOD 30 MIN: CPT | Mod: S$PBB,,, | Performed by: OBSTETRICS & GYNECOLOGY

## 2025-05-15 PROCEDURE — 99999 PR PBB SHADOW E&M-EST. PATIENT-LVL III: CPT | Mod: PBBFAC,,, | Performed by: OBSTETRICS & GYNECOLOGY

## 2025-05-15 PROCEDURE — 99213 OFFICE O/P EST LOW 20 MIN: CPT | Mod: PBBFAC | Performed by: OBSTETRICS & GYNECOLOGY

## 2025-05-15 NOTE — PROGRESS NOTES
CC: Problem Visit    Kaye Gunn is a 19 y.o. female  presents for vaginal discharge and odor x 2 weeks.  Denies vaginal itching.  She has a chronic history of yeast infection over the last 8 months. She was treated for BV and yeast 2025. She states she uses a feminine wash and Dove soap.  She wears cotton underwear.  Denies chronic use of panty liners.   She also states that she notice blood with wiping.  She does have constipation and hemorrhoids. She states that she has had unprotected intercourse in 2025.  She would like STD testing. LMP: Patient's last menstrual period was 2025 (approximate)..      OB History          0    Para   0    Term   0       0    AB   0    Living   0         SAB   0    IAB   0    Ectopic   0    Multiple   0    Live Births   0               Gynecology   History reviewed. No pertinent past medical history.  History reviewed. No pertinent surgical history.  Social History[1]  Family History   Problem Relation Name Age of Onset    Eclampsia Other      Miscarriages / Stillbirths Other       labor Other      Cancer Other      Breast cancer Neg Hx      Colon cancer Neg Hx      Ovarian cancer Neg Hx      Uterine cancer Neg Hx      Cervical cancer Neg Hx           /78 (Patient Position: Sitting)   Pulse 80   Wt 99.2 kg (218 lb 12.8 oz)   LMP 2025 (Approximate)   BMI 33.27 kg/m²       ROS  Review of Systems   Constitutional:  Negative for chills, fever and weight loss.   Gastrointestinal:  Positive for constipation. Negative for abdominal pain, blood in stool, diarrhea, melena, nausea and vomiting.   Genitourinary: Negative.    Psychiatric/Behavioral: Negative.            PHYSICAL EXAM:  Physical Exam  Constitutional:       General: She is not in acute distress.  Genitourinary:     General: Normal vulva.      Exam position: Lithotomy position.      Labia:         Right: No rash, tenderness or lesion.         Left: No rash, tenderness or  lesion.       Urethra: No prolapse, urethral pain, urethral swelling or urethral lesion.      Vagina: Vaginal discharge present. No erythema, tenderness or bleeding.      Cervix: Discharge present. No cervical motion tenderness, lesion or cervical bleeding.      Uterus: Normal. Not enlarged and not tender.       Adnexa: Right adnexa normal and left adnexa normal.        Right: No mass, tenderness or fullness.          Left: No mass, tenderness or fullness.        Rectum: External hemorrhoid present.   Neurological:      Mental Status: She is alert.   Psychiatric:         Behavior: Behavior is cooperative.            Vaginal discharge  -     Vaginosis Screen by DNA Probe; Future; Expected date: 05/15/2025  -     C. trachomatis/N. gonorrhoeae by AMP DNA    Vaginal odor  -     Vaginosis Screen by DNA Probe; Future; Expected date: 05/15/2025  -     C. trachomatis/N. gonorrhoeae by AMP DNA    Constipation, unspecified constipation type    External hemorrhoid    Screen for STD (sexually transmitted disease)  -     C. trachomatis/N. gonorrhoeae by AMP DNA      Pelvic exam with increased vaginal discharge otherwise unremarkable  Recommend plain soap without dyes or perfumes and keeping feminine hygiene simple  Patient had unprotected intercourse, discussed safe sex practices  STD testing done  Discussed chronic yeast infection and potential causes  Constipation and external hemorrhoid likely cause of blood on toilet paper  Discussed diet, eliminating processed foods, fried, greasy foods and encourage fruits, vegetables and at least 2L of water  Recommended a squatty potty to deter straining  RTO prn      Patient was counseled today on A.C.S. Pap guidelines and recommendations for yearly pelvic exams, mammograms and monthly self breast exams; to see her PCP for other health maintenance.     No follow-ups on file.         [1]   Social History  Socioeconomic History    Marital status: Single   Tobacco Use    Smoking status:  Never     Passive exposure: Never    Smokeless tobacco: Never   Substance and Sexual Activity    Alcohol use: Yes     Comment: Occasionally.    Drug use: Yes     Types: Marijuana     Comment: rarely    Sexual activity: Yes     Partners: Male     Birth control/protection: Implant     Social Drivers of Health     Financial Resource Strain: High Risk (2/8/2024)    Overall Financial Resource Strain (CARDIA)     Difficulty of Paying Living Expenses: Very hard   Food Insecurity: Patient Declined (2/8/2024)    Hunger Vital Sign     Worried About Running Out of Food in the Last Year: Patient declined     Ran Out of Food in the Last Year: Patient declined   Transportation Needs: Unknown (2/8/2024)    PRAPARE - Transportation     Lack of Transportation (Medical): Patient declined     Lack of Transportation (Non-Medical): No   Physical Activity: Inactive (2/8/2024)    Exercise Vital Sign     Days of Exercise per Week: 7 days     Minutes of Exercise per Session: 0 min   Stress: No Stress Concern Present (2/8/2024)    [employer] - Occupational Stress Questionnaire     Feeling of Stress : Not at all   Housing Stability: Patient Declined (2/8/2024)    Housing Stability Vital Sign     Unable to Pay for Housing in the Last Year: Patient declined     Unstable Housing in the Last Year: Patient declined

## 2025-05-16 ENCOUNTER — LAB VISIT (OUTPATIENT)
Dept: LAB | Facility: OTHER | Age: 20
End: 2025-05-16
Attending: OBSTETRICS & GYNECOLOGY

## 2025-05-16 DIAGNOSIS — Z11.3 SCREEN FOR STD (SEXUALLY TRANSMITTED DISEASE): ICD-10-CM

## 2025-05-16 DIAGNOSIS — Z20.2 POSSIBLE EXPOSURE TO STD: ICD-10-CM

## 2025-05-16 LAB
HAV IGM SERPL QL IA: NORMAL
HBV CORE IGM SERPL QL IA: NORMAL
HBV SURFACE AG SERPL QL IA: NORMAL
HCV AB SERPL QL IA: NORMAL
HIV 1+2 AB+HIV1 P24 AG SERPL QL IA: NEGATIVE
T PALLIDUM IGG+IGM SER QL: NEGATIVE

## 2025-05-16 PROCEDURE — 87389 HIV-1 AG W/HIV-1&-2 AB AG IA: CPT

## 2025-05-16 PROCEDURE — 36415 COLL VENOUS BLD VENIPUNCTURE: CPT

## 2025-05-16 PROCEDURE — 80074 ACUTE HEPATITIS PANEL: CPT

## 2025-05-16 PROCEDURE — 86593 SYPHILIS TEST NON-TREP QUANT: CPT

## 2025-05-20 ENCOUNTER — RESULTS FOLLOW-UP (OUTPATIENT)
Dept: OBSTETRICS AND GYNECOLOGY | Facility: CLINIC | Age: 20
End: 2025-05-20

## 2025-05-20 DIAGNOSIS — N76.0 BACTERIAL VAGINOSIS: Primary | ICD-10-CM

## 2025-05-20 DIAGNOSIS — B96.89 BACTERIAL VAGINOSIS: Primary | ICD-10-CM

## 2025-05-20 RX ORDER — METRONIDAZOLE 7.5 MG/G
1 GEL VAGINAL DAILY
Qty: 70 G | Refills: 1 | Status: SHIPPED | OUTPATIENT
Start: 2025-05-20 | End: 2025-05-25

## 2025-06-08 ENCOUNTER — ON-DEMAND VIRTUAL (OUTPATIENT)
Dept: URGENT CARE | Facility: CLINIC | Age: 20
End: 2025-06-08

## 2025-06-08 DIAGNOSIS — B00.9 HERPES: Primary | ICD-10-CM

## 2025-06-08 PROCEDURE — 98004 SYNCH AUDIO-VIDEO EST SF 10: CPT | Mod: 95,,, | Performed by: NURSE PRACTITIONER

## 2025-06-08 NOTE — PROGRESS NOTES
Subjective:      Patient ID: Kaye Gunn is a 19 y.o. female.    Vitals:  vitals were not taken for this visit.     Chief Complaint: Mouth Lesions      Visit Type: TELE AUDIOVISUAL - This visit was conducted virtually based on  subjective information and limited objective exam    Present with the patient at the time of consultation: TELEMED PRESENT WITH PATIENT: None  LOCATION OF PATIENT [place]  Two patient identifiers used to verify patient- saying out date of birth and full name.       History reviewed. No pertinent past medical history.  History reviewed. No pertinent surgical history.  Review of patient's allergies indicates:  No Known Allergies  Medications Ordered Prior to Encounter[1]  Family History   Problem Relation Name Age of Onset    Eclampsia Other      Miscarriages / Stillbirths Other       labor Other      Cancer Other      Breast cancer Neg Hx      Colon cancer Neg Hx      Ovarian cancer Neg Hx      Uterine cancer Neg Hx      Cervical cancer Neg Hx             No questionnaires on file.    20 yo female with c/o being diagnosed with oral herpes and she has a question. She states she took valtrex and it resolved. She wanted to know more information.     Mouth Lesions   Associated symptoms include mouth sores.       HENT:  Positive for mouth sores.         Objective:   The physical exam was conducted virtually.    AAO x 3 ; no acute distress noted; appearance normal; mood and behavior normal; thought process normal  Head- normocephalic  Nose- appears normal, no discharge or erythema  Eyes- pupils appear normal in size, no drainage, no erythema  Ears- normal appearing; no discharge, no erythema  Mouth- appears normal  Oropharynx- no erythema, lesions  Lungs- breathing at a normal rate, no acute distress noted  Heart- no reports of tachycardia, palpitations, chest pain  Abdomen- non distended, non tender reported by patient  Skin- warm and dry, no erythema or edema noted by patient  or visualized  Psych- as above; no si/hi      Assessment:     1. Herpes        Plan:         Thank you for choosing [facility]!    Our goal in the [facility] is to always provide outstanding medical care. You may receive a survey by mail or e-mail in the next week regarding your experience today. We would greatly appreciate you completing and returning the survey. Your feedback provides us with a way to recognize our staff who provide very good care, and it helps us learn how to improve when your experience was below our aspiration of excellence.         We appreciate you trusting us with your medical care. We hope you feel better soon. We will be happy to take care of you for all of your future medical needs.    You must understand that you've received an Urgent Care treatment only and that you may be released before all your medical problems are known or treated. You, the patient, will arrange for follow up care as instructed.    Follow up with your PCP or specialty clinic as directed in the next 1-2 weeks if not improved or as needed.  You can call [phone] to schedule an appointment with the appropriate provider.    If your condition worsens we recommend that you receive another evaluation in person, with your primary care provider, urgent care or at the emergency room immediately or contact your primary medical clinics after hours call service to discuss your concerns.         Herpes                         [1]   Current Outpatient Medications on File Prior to Visit   Medication Sig Dispense Refill    fluconazole (DIFLUCAN) 150 MG Tab Take 1 tablet (150 mg total) by mouth every 72 hours as needed (yeast infection symptoms). (Patient not taking: Reported on 5/15/2025) 2 tablet 0    valACYclovir (VALTREX) 1000 MG tablet Take 2 tablets (2,000 mg total) by mouth every 12 (twelve) hours. for 1 day 4 tablet 0     Current Facility-Administered Medications on File Prior to  Visit   Medication Dose Route Frequency Provider Last Rate Last Admin    etonogestreL subdermal device 68 mg  68 mg Implant  Noe Mejia NP   68 mg at 08/22/23 0900

## 2025-07-17 ENCOUNTER — TELEPHONE (OUTPATIENT)
Dept: OBSTETRICS AND GYNECOLOGY | Facility: CLINIC | Age: 20
End: 2025-07-17

## 2025-07-21 ENCOUNTER — LAB VISIT (OUTPATIENT)
Dept: LAB | Facility: OTHER | Age: 20
End: 2025-07-21
Attending: FAMILY MEDICINE

## 2025-07-21 ENCOUNTER — OFFICE VISIT (OUTPATIENT)
Dept: OBSTETRICS AND GYNECOLOGY | Facility: CLINIC | Age: 20
End: 2025-07-21

## 2025-07-21 VITALS
HEIGHT: 68 IN | SYSTOLIC BLOOD PRESSURE: 108 MMHG | WEIGHT: 217.81 LBS | BODY MASS INDEX: 33.01 KG/M2 | DIASTOLIC BLOOD PRESSURE: 80 MMHG

## 2025-07-21 DIAGNOSIS — N76.0 ACUTE VAGINITIS: Primary | ICD-10-CM

## 2025-07-21 DIAGNOSIS — Z20.2 POTENTIAL EXPOSURE TO STD: ICD-10-CM

## 2025-07-21 LAB
HBV SURFACE AG SERPL QL IA: NORMAL
HCV AB SERPL QL IA: NEGATIVE
HIV 1+2 AB+HIV1 P24 AG SERPL QL IA: NEGATIVE
T PALLIDUM IGG+IGM SER QL: NEGATIVE

## 2025-07-21 PROCEDURE — 86803 HEPATITIS C AB TEST: CPT

## 2025-07-21 PROCEDURE — 87491 CHLMYD TRACH DNA AMP PROBE: CPT | Performed by: FAMILY MEDICINE

## 2025-07-21 PROCEDURE — 81515 NFCT DS BV&VAGINITIS DNA ALG: CPT | Performed by: FAMILY MEDICINE

## 2025-07-21 PROCEDURE — 87389 HIV-1 AG W/HIV-1&-2 AB AG IA: CPT

## 2025-07-21 PROCEDURE — 36415 COLL VENOUS BLD VENIPUNCTURE: CPT

## 2025-07-21 PROCEDURE — 87340 HEPATITIS B SURFACE AG IA: CPT

## 2025-07-21 PROCEDURE — 86593 SYPHILIS TEST NON-TREP QUANT: CPT

## 2025-07-21 PROCEDURE — 99999 PR PBB SHADOW E&M-EST. PATIENT-LVL III: CPT | Mod: PBBFAC,,, | Performed by: FAMILY MEDICINE

## 2025-07-21 PROCEDURE — 99213 OFFICE O/P EST LOW 20 MIN: CPT | Mod: PBBFAC | Performed by: FAMILY MEDICINE

## 2025-07-21 PROCEDURE — 99214 OFFICE O/P EST MOD 30 MIN: CPT | Mod: S$PBB,,, | Performed by: FAMILY MEDICINE

## 2025-07-21 RX ORDER — FLUCONAZOLE 150 MG/1
150 TABLET ORAL
Qty: 2 TABLET | Refills: 0 | Status: SHIPPED | OUTPATIENT
Start: 2025-07-21

## 2025-07-21 NOTE — PROGRESS NOTES
CC: Vaginitis  HPI: Patient presents for evaluation of an abnormal vaginal discharge. Symptoms have been present for 5 days. Vaginal symptoms: discharge described as white and curd-like, local irritation, and vulvar itching. Contraception: nexplanon. She denies odor and urinary symptoms of dysuria, hematuria, and fever. Felt like she had a uti two weeks ago (odor to urine), took macrobid bid x5 days and sx resolved. Sexually transmitted infection risk: possible STD exposure. (Wants to make sure she doesn't have genital herpes, has had HSV oral before). SA female. Started monistat 7 which has helped. This is the extent of the patient's complaints at this time.     ROS:  GENERAL: No fever, chills, fatigability or weight loss.  VULVAR: No pain, no lesions and no itching.  VAGINAL: No relaxation, + itching, + discharge, no abnormal bleeding and no lesions.  URINARY: No incontinence, no nocturia, no frequency and no dysuria.     PHYSICAL EXAM:  Physical Exam:   Constitutional: She appears well-developed and well-nourished. She does not appear ill. No distress.               Genitourinary:    Pelvic exam was performed with patient in the lithotomy position.   The external female genitalia was normal.     Labial bartholins normal.There is no rash, tenderness or lesion on the right labia. There is no rash, tenderness or lesion on the left labia. Cervix is normal. There is bleeding (menses) in the vagina. No vaginal discharge, tenderness, rectocele, cystocele or prolapse of vaginal walls in the vagina.    No foreign body in the vagina.   Cervix exhibits no lesion, no discharge, no friability and no polyp. Normal urethral meatus.Urethra findings: no urethral mass              Neurological: She is alert. GCS eye subscore is 4. GCS verbal subscore is 5. GCS motor subscore is 6.           History reviewed. No pertinent past medical history.    History reviewed. No pertinent surgical history.    Family History   Problem Relation  Name Age of Onset    Eclampsia Other      Miscarriages / Stillbirths Other       labor Other      Cancer Other      Breast cancer Neg Hx      Colon cancer Neg Hx      Ovarian cancer Neg Hx      Uterine cancer Neg Hx      Cervical cancer Neg Hx         Social History     Socioeconomic History    Marital status: Single   Tobacco Use    Smoking status: Never     Passive exposure: Never    Smokeless tobacco: Never   Substance and Sexual Activity    Alcohol use: Yes     Comment: Occasionally.    Drug use: Yes     Types: Marijuana     Comment: rarely    Sexual activity: Yes     Partners: Male     Birth control/protection: Implant     Social Drivers of Health     Financial Resource Strain: High Risk (2024)    Overall Financial Resource Strain (CARDIA)     Difficulty of Paying Living Expenses: Very hard   Food Insecurity: Patient Declined (2024)    Hunger Vital Sign     Worried About Running Out of Food in the Last Year: Patient declined     Ran Out of Food in the Last Year: Patient declined   Transportation Needs: Unknown (2024)    PRAPARE - Transportation     Lack of Transportation (Medical): Patient declined     Lack of Transportation (Non-Medical): No   Physical Activity: Inactive (2024)    Exercise Vital Sign     Days of Exercise per Week: 7 days     Minutes of Exercise per Session: 0 min   Stress: No Stress Concern Present (2024)    [employer] - Occupational Stress Questionnaire     Feeling of Stress : Not at all   Housing Stability: Patient Declined (2024)    Housing Stability Vital Sign     Unable to Pay for Housing in the Last Year: Patient declined     Unstable Housing in the Last Year: Patient declined       Current Medications[1]    Review of patient's allergies indicates:  No Known Allergies       OB History    Para Term  AB Living   0 0 0 0 0 0   SAB IAB Ectopic Multiple Live Births   0 0 0 0 0          Assessment/Plan:    Acute  vaginitis  -     Vaginosis Screen by DNA Probe  -     fluconazole (DIFLUCAN) 150 MG Tab; Take 1 tablet (150 mg total) by mouth every 72 hours as needed (vaginal itching/discharge).  Dispense: 2 tablet; Refill: 0    Potential exposure to STD  -     C. trachomatis/N. gonorrhoeae by AMP DNA [facility]; Cervicovaginal  -     HIV 1/2 Ag/Ab (4th Gen); Future; Expected date: 07/21/2025  -     Treponema Pallidium Antibodies IgG, IgM; Future; Expected date: 07/21/2025  -     Hepatitis C Antibody; Future; Expected date: 07/21/2025  -     Hepatitis B Surface Antigen; Future; Expected date: 07/21/2025          Patient was counseled today on vaginitis prevention including :  a. avoiding feminine products such as deoderant soaps, body wash, bubble bath, douches, scented toilet paper, deoderant tampons or pads, feminine wipes, chronic pad use, etc.  b. avoiding other vulvovaginal irritants such as long hot baths, humidity, tight, synthetic clothing, chlorine and sitting around in wet bathing suits  c. wearing cotton underwear, avoiding thong underwear and no underwear to bed  d. taking showers instead of baths and use a hair dryer on cool setting afterwards to dry  e. wearing cotton to exercise and shower immediately after exercise and change clothes  f. using polyurethane condoms without spermicide if sexually active and symptoms are triggered by intercourse     FOLLOW UP: PRN/lack of improvement.         [1]   Current Outpatient Medications   Medication Sig Dispense Refill    fluconazole (DIFLUCAN) 150 MG Tab Take 1 tablet (150 mg total) by mouth every 72 hours as needed (vaginal itching/discharge). 2 tablet 0    valACYclovir (VALTREX) 1000 MG tablet Take 2 tablets (2,000 mg total) by mouth every 12 (twelve) hours. for 1 day 4 tablet 0     Current Facility-Administered Medications   Medication Dose Route Frequency Provider Last Rate Last Admin    etonogestreL subdermal device 68 mg  68 mg Implant  Noe Mejia NP   68 mg at  08/22/23 0900

## 2025-07-23 LAB
BACTERIAL VAGINOSIS DNA (OHS): DETECTED
CANDIDA GLABRATA/KRUSEI DNA (OHS): NOT DETECTED
CANDIDA SPECIES DNA (OHS): NOT DETECTED
TRICHOMONAS VAGINALIS DNA (OHS): NOT DETECTED

## 2025-07-25 LAB
C TRACH DNA SPEC QL NAA+PROBE: NOT DETECTED
CTGC SOURCE (OHS) ORD-325: NORMAL
N GONORRHOEA DNA UR QL NAA+PROBE: NOT DETECTED